# Patient Record
Sex: MALE | Race: WHITE | NOT HISPANIC OR LATINO | URBAN - METROPOLITAN AREA
[De-identification: names, ages, dates, MRNs, and addresses within clinical notes are randomized per-mention and may not be internally consistent; named-entity substitution may affect disease eponyms.]

---

## 2017-01-03 ENCOUNTER — APPOINTMENT (RX ONLY)
Dept: URBAN - METROPOLITAN AREA CLINIC 343 | Facility: CLINIC | Age: 82
Setting detail: DERMATOLOGY
End: 2017-01-03

## 2017-01-03 DIAGNOSIS — L57.0 ACTINIC KERATOSIS: ICD-10-CM

## 2017-01-03 DIAGNOSIS — Z48.02 ENCOUNTER FOR REMOVAL OF SUTURES: ICD-10-CM

## 2017-01-03 PROBLEM — I25.10 ATHEROSCLEROTIC HEART DISEASE OF NATIVE CORONARY ARTERY WITHOUT ANGINA PECTORIS: Status: ACTIVE | Noted: 2017-01-03

## 2017-01-03 PROBLEM — Z85.828 PERSONAL HISTORY OF OTHER MALIGNANT NEOPLASM OF SKIN: Status: ACTIVE | Noted: 2017-01-03

## 2017-01-03 PROCEDURE — ? LIQUID NITROGEN

## 2017-01-03 PROCEDURE — ? SUTURE REMOVAL (GLOBAL PERIOD)

## 2017-01-03 PROCEDURE — 17003 DESTRUCT PREMALG LES 2-14: CPT

## 2017-01-03 PROCEDURE — 17000 DESTRUCT PREMALG LESION: CPT

## 2017-01-03 ASSESSMENT — LOCATION SIMPLE DESCRIPTION DERM
LOCATION SIMPLE: LEFT CALF
LOCATION SIMPLE: POSTERIOR SCALP
LOCATION SIMPLE: SCALP
LOCATION SIMPLE: RIGHT CALF
LOCATION SIMPLE: LEFT EAR

## 2017-01-03 ASSESSMENT — LOCATION DETAILED DESCRIPTION DERM
LOCATION DETAILED: LEFT INFERIOR POSTAURICULAR SKIN
LOCATION DETAILED: RIGHT INFERIOR POSTAURICULAR SKIN
LOCATION DETAILED: LEFT POSTERIOR EAR
LOCATION DETAILED: LEFT POSTAURICULAR SKIN
LOCATION DETAILED: RIGHT DISTAL CALF
LOCATION DETAILED: LEFT DISTAL CALF

## 2017-01-03 ASSESSMENT — LOCATION ZONE DERM
LOCATION ZONE: LEG
LOCATION ZONE: SCALP
LOCATION ZONE: EAR

## 2017-01-03 NOTE — PROCEDURE: SUTURE REMOVAL (GLOBAL PERIOD)
Body Location Override (Optional - Billing Will Still Be Based On Selected Body Map Location If Applicable): right calf
Detail Level: Detailed
Add 57430 Cpt? (Important Note: In 2017 The Use Of 44832 Is Being Tracked By Cms To Determine Future Global Period Reimbursement For Global Periods): no

## 2017-01-03 NOTE — PROCEDURE: LIQUID NITROGEN
Detail Level: Detailed
Duration Of Freeze Thaw-Cycle (Seconds): 5
Number Of Freeze-Thaw Cycles: 3 freeze-thaw cycles
Render Post-Care Instructions In Note?: no
Post-Care Instructions: I reviewed with the patient in detail post-care instructions. Patient is to wear sunprotection, and avoid picking at any of the treated lesions. Pt may apply Vaseline to crusted or scabbing areas.
Number Of Freeze-Thaw Cycles: 2 freeze-thaw cycles
Consent: The patient's consent was obtained including but not limited to risks of crusting, scabbing, blistering, scarring, darker or lighter pigmentary change, recurrence, incomplete removal and infection.

## 2017-02-07 ENCOUNTER — APPOINTMENT (RX ONLY)
Dept: URBAN - METROPOLITAN AREA CLINIC 343 | Facility: CLINIC | Age: 82
Setting detail: DERMATOLOGY
End: 2017-02-07

## 2017-02-07 DIAGNOSIS — L82.1 OTHER SEBORRHEIC KERATOSIS: ICD-10-CM

## 2017-02-07 DIAGNOSIS — Z85.828 PERSONAL HISTORY OF OTHER MALIGNANT NEOPLASM OF SKIN: ICD-10-CM

## 2017-02-07 PROBLEM — L57.0 ACTINIC KERATOSIS: Status: ACTIVE | Noted: 2017-02-07

## 2017-02-07 PROBLEM — D48.5 NEOPLASM OF UNCERTAIN BEHAVIOR OF SKIN: Status: ACTIVE | Noted: 2017-02-07

## 2017-02-07 PROBLEM — E78.5 HYPERLIPIDEMIA, UNSPECIFIED: Status: ACTIVE | Noted: 2017-02-07

## 2017-02-07 PROCEDURE — ? BIOPSY BY SHAVE METHOD

## 2017-02-07 PROCEDURE — 99213 OFFICE O/P EST LOW 20 MIN: CPT | Mod: 25

## 2017-02-07 PROCEDURE — 11100: CPT

## 2017-02-07 PROCEDURE — ? COUNSELING

## 2017-02-07 ASSESSMENT — LOCATION ZONE DERM
LOCATION ZONE: SCALP
LOCATION ZONE: FACE

## 2017-02-07 ASSESSMENT — LOCATION SIMPLE DESCRIPTION DERM
LOCATION SIMPLE: LEFT ZYGOMA
LOCATION SIMPLE: SCALP

## 2017-02-07 ASSESSMENT — LOCATION DETAILED DESCRIPTION DERM
LOCATION DETAILED: LEFT CENTRAL ZYGOMA
LOCATION DETAILED: LEFT SUPERIOR POSTAURICULAR SKIN

## 2017-02-07 NOTE — PROCEDURE: BIOPSY BY SHAVE METHOD
Body Location Override (Optional - Billing Will Still Be Based On Selected Body Map Location If Applicable): left postauricular scalp

## 2017-02-20 ENCOUNTER — APPOINTMENT (RX ONLY)
Dept: URBAN - METROPOLITAN AREA CLINIC 343 | Facility: CLINIC | Age: 82
Setting detail: DERMATOLOGY
End: 2017-02-20

## 2017-02-20 PROBLEM — C44.42 SQUAMOUS CELL CARCINOMA OF SKIN OF SCALP AND NECK: Status: ACTIVE | Noted: 2017-02-20

## 2017-02-20 PROBLEM — L57.0 ACTINIC KERATOSIS: Status: ACTIVE | Noted: 2017-02-20

## 2017-02-20 PROCEDURE — 13121 CMPLX RPR S/A/L 2.6-7.5 CM: CPT

## 2017-02-20 PROCEDURE — ? CONSULTATION FOR MOHS SURGERY

## 2017-02-20 PROCEDURE — 17311 MOHS 1 STAGE H/N/HF/G: CPT

## 2017-02-20 PROCEDURE — 17312 MOHS ADDL STAGE: CPT

## 2017-02-20 PROCEDURE — ? PRESCRIPTION

## 2017-02-20 PROCEDURE — ? MOHS SURGERY

## 2017-02-20 PROCEDURE — 99212 OFFICE O/P EST SF 10 MIN: CPT | Mod: 25

## 2017-02-20 RX ORDER — CEPHALEXIN 500 MG/1
1 CAPSULE ORAL BID
Qty: 14 | Refills: 0 | Status: ERX

## 2017-02-20 NOTE — PROCEDURE: MOHS SURGERY
Stage 2: Additional Anesthesia Type: 1% lidocaine, 0.25% Marcaine, 1:600,000 epinephrine and a 1:10 solution of sodium bicarbonate

## 2017-02-20 NOTE — PROCEDURE: CONSULTATION FOR MOHS SURGERY
Location Indication Override (Is Already Calculated Based On Selected Body Location): Area H
Incorporate Mauc In Note: Yes
Body Location Override (Optional - Billing Will Still Be Based On Selected Body Map Location If Applicable): left postauricular scalp
Size Of Lesion: 1.7
X Size Of Lesion In Cm (Optional): 1.4
Detail Level: Detailed

## 2017-03-06 ENCOUNTER — APPOINTMENT (RX ONLY)
Dept: URBAN - METROPOLITAN AREA CLINIC 343 | Facility: CLINIC | Age: 82
Setting detail: DERMATOLOGY
End: 2017-03-06

## 2017-03-06 DIAGNOSIS — Z48.02 ENCOUNTER FOR REMOVAL OF SUTURES: ICD-10-CM

## 2017-03-06 PROCEDURE — ? SUTURE REMOVAL (GLOBAL PERIOD)

## 2017-03-06 ASSESSMENT — LOCATION ZONE DERM: LOCATION ZONE: EAR

## 2017-03-06 ASSESSMENT — LOCATION SIMPLE DESCRIPTION DERM: LOCATION SIMPLE: LEFT EAR

## 2017-03-06 ASSESSMENT — LOCATION DETAILED DESCRIPTION DERM: LOCATION DETAILED: LEFT POSTAURICULAR CREASE

## 2017-03-06 NOTE — PROCEDURE: SUTURE REMOVAL (GLOBAL PERIOD)
Detail Level: Detailed
Body Location Override (Optional - Billing Will Still Be Based On Selected Body Map Location If Applicable): left postauricular scalp
Add 77986 Cpt? (Important Note: In 2017 The Use Of 00692 Is Being Tracked By Cms To Determine Future Global Period Reimbursement For Global Periods): no

## 2017-08-01 ENCOUNTER — APPOINTMENT (RX ONLY)
Dept: URBAN - METROPOLITAN AREA CLINIC 343 | Facility: CLINIC | Age: 82
Setting detail: DERMATOLOGY
End: 2017-08-01

## 2017-08-01 DIAGNOSIS — L57.0 ACTINIC KERATOSIS: ICD-10-CM

## 2017-08-01 DIAGNOSIS — B07.8 OTHER VIRAL WARTS: ICD-10-CM

## 2017-08-01 DIAGNOSIS — D22 MELANOCYTIC NEVI: ICD-10-CM

## 2017-08-01 DIAGNOSIS — Z85.828 PERSONAL HISTORY OF OTHER MALIGNANT NEOPLASM OF SKIN: ICD-10-CM

## 2017-08-01 DIAGNOSIS — D18.0 HEMANGIOMA: ICD-10-CM

## 2017-08-01 DIAGNOSIS — L82.1 OTHER SEBORRHEIC KERATOSIS: ICD-10-CM

## 2017-08-01 PROBLEM — D22.5 MELANOCYTIC NEVI OF TRUNK: Status: ACTIVE | Noted: 2017-08-01

## 2017-08-01 PROBLEM — I25.10 ATHEROSCLEROTIC HEART DISEASE OF NATIVE CORONARY ARTERY WITHOUT ANGINA PECTORIS: Status: ACTIVE | Noted: 2017-08-01

## 2017-08-01 PROBLEM — D48.5 NEOPLASM OF UNCERTAIN BEHAVIOR OF SKIN: Status: ACTIVE | Noted: 2017-08-01

## 2017-08-01 PROBLEM — E78.5 HYPERLIPIDEMIA, UNSPECIFIED: Status: ACTIVE | Noted: 2017-08-01

## 2017-08-01 PROBLEM — D18.01 HEMANGIOMA OF SKIN AND SUBCUTANEOUS TISSUE: Status: ACTIVE | Noted: 2017-08-01

## 2017-08-01 PROCEDURE — 17110 DESTRUCTION B9 LES UP TO 14: CPT

## 2017-08-01 PROCEDURE — ? LIQUID NITROGEN

## 2017-08-01 PROCEDURE — 11100: CPT | Mod: 59

## 2017-08-01 PROCEDURE — ? BIOPSY BY SHAVE METHOD

## 2017-08-01 PROCEDURE — ? COUNSELING

## 2017-08-01 PROCEDURE — 99213 OFFICE O/P EST LOW 20 MIN: CPT | Mod: 25

## 2017-08-01 PROCEDURE — 17000 DESTRUCT PREMALG LESION: CPT | Mod: 59

## 2017-08-01 PROCEDURE — 17003 DESTRUCT PREMALG LES 2-14: CPT

## 2017-08-01 ASSESSMENT — LOCATION DETAILED DESCRIPTION DERM
LOCATION DETAILED: RIGHT PROXIMAL DORSAL FOREARM
LOCATION DETAILED: LEFT LATERAL DORSAL WRIST
LOCATION DETAILED: LEFT RADIAL DORSAL HAND
LOCATION DETAILED: RIGHT SUPERIOR PARIETAL SCALP
LOCATION DETAILED: EPIGASTRIC SKIN
LOCATION DETAILED: RIGHT RADIAL DORSAL HAND
LOCATION DETAILED: PERIUMBILICAL SKIN
LOCATION DETAILED: LEFT SUPERIOR PARIETAL SCALP
LOCATION DETAILED: RIGHT LATERAL EYEBROW

## 2017-08-01 ASSESSMENT — LOCATION SIMPLE DESCRIPTION DERM
LOCATION SIMPLE: RIGHT EYEBROW
LOCATION SIMPLE: ABDOMEN
LOCATION SIMPLE: LEFT HAND
LOCATION SIMPLE: RIGHT FOREARM
LOCATION SIMPLE: RIGHT HAND
LOCATION SIMPLE: SCALP
LOCATION SIMPLE: LEFT WRIST

## 2017-08-01 ASSESSMENT — LOCATION ZONE DERM
LOCATION ZONE: TRUNK
LOCATION ZONE: FACE
LOCATION ZONE: ARM
LOCATION ZONE: HAND
LOCATION ZONE: SCALP

## 2017-08-22 ENCOUNTER — APPOINTMENT (RX ONLY)
Dept: URBAN - METROPOLITAN AREA CLINIC 343 | Facility: CLINIC | Age: 82
Setting detail: DERMATOLOGY
End: 2017-08-22

## 2017-08-22 DIAGNOSIS — L82.1 OTHER SEBORRHEIC KERATOSIS: ICD-10-CM

## 2017-08-22 PROBLEM — C44.629 SQUAMOUS CELL CARCINOMA OF SKIN OF LEFT UPPER LIMB, INCLUDING SHOULDER: Status: ACTIVE | Noted: 2017-08-22

## 2017-08-22 PROBLEM — Z85.828 PERSONAL HISTORY OF OTHER MALIGNANT NEOPLASM OF SKIN: Status: ACTIVE | Noted: 2017-08-22

## 2017-08-22 PROBLEM — E78.5 HYPERLIPIDEMIA, UNSPECIFIED: Status: ACTIVE | Noted: 2017-08-22

## 2017-08-22 PROBLEM — I25.10 ATHEROSCLEROTIC HEART DISEASE OF NATIVE CORONARY ARTERY WITHOUT ANGINA PECTORIS: Status: ACTIVE | Noted: 2017-08-22

## 2017-08-22 PROCEDURE — ? EXCISION

## 2017-08-22 PROCEDURE — 11603 EXC TR-EXT MAL+MARG 2.1-3 CM: CPT

## 2017-08-22 PROCEDURE — 12032 INTMD RPR S/A/T/EXT 2.6-7.5: CPT

## 2017-08-22 PROCEDURE — ? COUNSELING

## 2017-08-22 PROCEDURE — 99212 OFFICE O/P EST SF 10 MIN: CPT | Mod: 25

## 2017-08-22 ASSESSMENT — LOCATION ZONE DERM
LOCATION ZONE: LEG
LOCATION ZONE: TRUNK

## 2017-08-22 ASSESSMENT — LOCATION DETAILED DESCRIPTION DERM
LOCATION DETAILED: RIGHT DISTAL PRETIBIAL REGION
LOCATION DETAILED: LEFT DISTAL PRETIBIAL REGION
LOCATION DETAILED: LEFT LATERAL ABDOMEN

## 2017-08-22 ASSESSMENT — LOCATION SIMPLE DESCRIPTION DERM
LOCATION SIMPLE: ABDOMEN
LOCATION SIMPLE: RIGHT PRETIBIAL REGION
LOCATION SIMPLE: LEFT PRETIBIAL REGION

## 2017-08-22 NOTE — PROCEDURE: EXCISION
Path Notes (To The Dermatopathologist): Please check margins. Tagged at 12 o'clock superiorly. Previous accession: [id number] Path Notes (To The Dermatopathologist): Please check margins. Tagged at 12 o'clock superiorly. Previous accession: [id number]

## 2017-09-06 ENCOUNTER — APPOINTMENT (RX ONLY)
Dept: URBAN - METROPOLITAN AREA CLINIC 343 | Facility: CLINIC | Age: 82
Setting detail: DERMATOLOGY
End: 2017-09-06

## 2017-09-06 DIAGNOSIS — Z48.02 ENCOUNTER FOR REMOVAL OF SUTURES: ICD-10-CM

## 2017-09-06 PROBLEM — Z85.828 PERSONAL HISTORY OF OTHER MALIGNANT NEOPLASM OF SKIN: Status: ACTIVE | Noted: 2017-09-06

## 2017-09-06 PROCEDURE — ? SUTURE REMOVAL (GLOBAL PERIOD)

## 2017-09-06 ASSESSMENT — LOCATION SIMPLE DESCRIPTION DERM: LOCATION SIMPLE: LEFT FOREARM

## 2017-09-06 ASSESSMENT — LOCATION DETAILED DESCRIPTION DERM: LOCATION DETAILED: LEFT DISTAL DORSAL FOREARM

## 2017-09-06 ASSESSMENT — LOCATION ZONE DERM: LOCATION ZONE: ARM

## 2017-09-06 NOTE — PROCEDURE: SUTURE REMOVAL (GLOBAL PERIOD)
Add 37608 Cpt? (Important Note: In 2017 The Use Of 89092 Is Being Tracked By Cms To Determine Future Global Period Reimbursement For Global Periods): no

## 2017-09-18 ENCOUNTER — APPOINTMENT (RX ONLY)
Dept: URBAN - METROPOLITAN AREA CLINIC 343 | Facility: CLINIC | Age: 82
Setting detail: DERMATOLOGY
End: 2017-09-18

## 2017-09-18 DIAGNOSIS — L82.1 OTHER SEBORRHEIC KERATOSIS: ICD-10-CM

## 2017-09-18 PROBLEM — L57.0 ACTINIC KERATOSIS: Status: ACTIVE | Noted: 2017-09-18

## 2017-09-18 PROCEDURE — ? LIQUID NITROGEN (COSMETIC)

## 2017-09-18 ASSESSMENT — LOCATION DETAILED DESCRIPTION DERM
LOCATION DETAILED: LEFT LATERAL TRAPEZIAL NECK
LOCATION DETAILED: LEFT LATERAL ABDOMEN
LOCATION DETAILED: RIGHT INFERIOR LATERAL UPPER BACK
LOCATION DETAILED: LEFT RIB CAGE
LOCATION DETAILED: LEFT POSTERIOR SHOULDER
LOCATION DETAILED: RIGHT SUPERIOR MEDIAL MIDBACK
LOCATION DETAILED: RIGHT INFRAMAMMARY CREASE (OUTER QUADRANT)
LOCATION DETAILED: LEFT PROXIMAL POSTERIOR UPPER ARM
LOCATION DETAILED: LEFT LATERAL UPPER BACK
LOCATION DETAILED: LEFT INFERIOR UPPER BACK
LOCATION DETAILED: RIGHT INFERIOR LATERAL MIDBACK
LOCATION DETAILED: RIGHT SUPERIOR LATERAL UPPER BACK
LOCATION DETAILED: LEFT MID-UPPER BACK
LOCATION DETAILED: LEFT SUPERIOR UPPER BACK
LOCATION DETAILED: LEFT INFERIOR LATERAL MIDBACK
LOCATION DETAILED: RIGHT INFERIOR UPPER BACK
LOCATION DETAILED: LEFT SUPERIOR LATERAL LOWER BACK
LOCATION DETAILED: RIGHT INFERIOR MEDIAL UPPER BACK
LOCATION DETAILED: RIGHT SUPERIOR LATERAL LOWER BACK
LOCATION DETAILED: LEFT SUPERIOR LATERAL MIDBACK
LOCATION DETAILED: RIGHT MID-UPPER BACK
LOCATION DETAILED: LEFT SUPERIOR LATERAL UPPER BACK
LOCATION DETAILED: RIGHT LATERAL UPPER BACK
LOCATION DETAILED: RIGHT INFERIOR MEDIAL MIDBACK
LOCATION DETAILED: RIGHT SUPERIOR LATERAL MIDBACK
LOCATION DETAILED: LEFT INFERIOR LATERAL LOWER BACK
LOCATION DETAILED: RIGHT LATERAL ABDOMEN
LOCATION DETAILED: LEFT INFERIOR LATERAL UPPER BACK
LOCATION DETAILED: RIGHT POSTERIOR SHOULDER
LOCATION DETAILED: RIGHT RIB CAGE
LOCATION DETAILED: LEFT PROXIMAL LATERAL POSTERIOR UPPER ARM
LOCATION DETAILED: LEFT INFERIOR MEDIAL MIDBACK
LOCATION DETAILED: RIGHT MEDIAL UPPER BACK

## 2017-09-18 ASSESSMENT — LOCATION SIMPLE DESCRIPTION DERM
LOCATION SIMPLE: LEFT UPPER BACK
LOCATION SIMPLE: RIGHT BREAST
LOCATION SIMPLE: ABDOMEN
LOCATION SIMPLE: RIGHT LOWER BACK
LOCATION SIMPLE: LEFT UPPER ARM
LOCATION SIMPLE: LEFT LOWER BACK
LOCATION SIMPLE: POSTERIOR NECK
LOCATION SIMPLE: RIGHT SHOULDER
LOCATION SIMPLE: LEFT SHOULDER
LOCATION SIMPLE: RIGHT UPPER BACK

## 2017-09-18 ASSESSMENT — LOCATION ZONE DERM
LOCATION ZONE: NECK
LOCATION ZONE: ARM
LOCATION ZONE: TRUNK

## 2017-09-18 NOTE — PROCEDURE: LIQUID NITROGEN (COSMETIC)
Post-Care Instructions: I reviewed with the patient in detail post-care instructions. Patient is to wear sunprotection, and avoid picking at any of the treated lesions. Pt may apply Aquaphor ointment to crusted or scabbing areas.
Detail Level: Detailed
Price (Use Numbers Only, No Special Characters Or $): 0
Consent: The patient's consent was obtained including but not limited to risks of crusting, scabbing, blistering, scarring, darker or lighter pigmentary change, recurrence, incomplete removal and infection. The patient understands that the procedure is cosmetic in nature and is not covered by insurance.
Render Post-Care Instructions In Note?: yes

## 2018-02-01 ENCOUNTER — APPOINTMENT (RX ONLY)
Dept: URBAN - METROPOLITAN AREA CLINIC 343 | Facility: CLINIC | Age: 83
Setting detail: DERMATOLOGY
End: 2018-02-01

## 2018-02-01 DIAGNOSIS — B07.8 OTHER VIRAL WARTS: ICD-10-CM

## 2018-02-01 DIAGNOSIS — L57.0 ACTINIC KERATOSIS: ICD-10-CM

## 2018-02-01 DIAGNOSIS — L82.0 INFLAMED SEBORRHEIC KERATOSIS: ICD-10-CM

## 2018-02-01 DIAGNOSIS — D22 MELANOCYTIC NEVI: ICD-10-CM

## 2018-02-01 DIAGNOSIS — L82.1 OTHER SEBORRHEIC KERATOSIS: ICD-10-CM

## 2018-02-01 DIAGNOSIS — Z85.828 PERSONAL HISTORY OF OTHER MALIGNANT NEOPLASM OF SKIN: ICD-10-CM

## 2018-02-01 PROBLEM — D22.5 MELANOCYTIC NEVI OF TRUNK: Status: ACTIVE | Noted: 2018-02-01

## 2018-02-01 PROBLEM — D48.5 NEOPLASM OF UNCERTAIN BEHAVIOR OF SKIN: Status: ACTIVE | Noted: 2018-02-01

## 2018-02-01 PROBLEM — E78.5 HYPERLIPIDEMIA, UNSPECIFIED: Status: ACTIVE | Noted: 2018-02-01

## 2018-02-01 PROBLEM — I25.10 ATHEROSCLEROTIC HEART DISEASE OF NATIVE CORONARY ARTERY WITHOUT ANGINA PECTORIS: Status: ACTIVE | Noted: 2018-02-01

## 2018-02-01 PROCEDURE — 17110 DESTRUCTION B9 LES UP TO 14: CPT

## 2018-02-01 PROCEDURE — 99213 OFFICE O/P EST LOW 20 MIN: CPT | Mod: 25

## 2018-02-01 PROCEDURE — ? COUNSELING

## 2018-02-01 PROCEDURE — ? LIQUID NITROGEN

## 2018-02-01 PROCEDURE — 17000 DESTRUCT PREMALG LESION: CPT | Mod: 59

## 2018-02-01 PROCEDURE — ? BIOPSY BY SHAVE METHOD

## 2018-02-01 PROCEDURE — ? OTHER

## 2018-02-01 PROCEDURE — 17003 DESTRUCT PREMALG LES 2-14: CPT

## 2018-02-01 PROCEDURE — 11100: CPT | Mod: 59

## 2018-02-01 ASSESSMENT — LOCATION ZONE DERM
LOCATION ZONE: NECK
LOCATION ZONE: NOSE
LOCATION ZONE: EAR
LOCATION ZONE: SCALP
LOCATION ZONE: TRUNK
LOCATION ZONE: LEG
LOCATION ZONE: FACE
LOCATION ZONE: ARM

## 2018-02-01 ASSESSMENT — LOCATION DETAILED DESCRIPTION DERM
LOCATION DETAILED: RIGHT INFERIOR CRUS OF ANTIHELIX
LOCATION DETAILED: LEFT INFERIOR CRUS OF ANTIHELIX
LOCATION DETAILED: RIGHT MID TEMPLE
LOCATION DETAILED: RIGHT LATERAL EYEBROW
LOCATION DETAILED: LEFT LATERAL ZYGOMA
LOCATION DETAILED: RIGHT DISTAL DORSAL FOREARM
LOCATION DETAILED: RIGHT SUPERIOR UPPER BACK
LOCATION DETAILED: RIGHT POSTERIOR PARIETAL SCALP
LOCATION DETAILED: LEFT SUPERIOR LATERAL NECK
LOCATION DETAILED: LEFT SUPERIOR HELIX
LOCATION DETAILED: RIGHT DISTAL PRETIBIAL REGION
LOCATION DETAILED: RIGHT SUPERIOR OCCIPITAL SCALP
LOCATION DETAILED: POSTERIOR MID-PARIETAL SCALP
LOCATION DETAILED: LEFT LATERAL TRAPEZIAL NECK
LOCATION DETAILED: LEFT NASAL SIDEWALL
LOCATION DETAILED: LEFT SUPERIOR LATERAL MALAR CHEEK
LOCATION DETAILED: LEFT SUPERIOR UPPER BACK

## 2018-02-01 ASSESSMENT — LOCATION SIMPLE DESCRIPTION DERM
LOCATION SIMPLE: LEFT NOSE
LOCATION SIMPLE: RIGHT UPPER BACK
LOCATION SIMPLE: POSTERIOR SCALP
LOCATION SIMPLE: RIGHT EAR
LOCATION SIMPLE: NECK
LOCATION SIMPLE: LEFT ZYGOMA
LOCATION SIMPLE: RIGHT FOREARM
LOCATION SIMPLE: LEFT CHEEK
LOCATION SIMPLE: RIGHT EYEBROW
LOCATION SIMPLE: LEFT UPPER BACK
LOCATION SIMPLE: RIGHT PRETIBIAL REGION
LOCATION SIMPLE: LEFT EAR
LOCATION SIMPLE: POSTERIOR NECK
LOCATION SIMPLE: RIGHT TEMPLE

## 2018-02-01 NOTE — PROCEDURE: LIQUID NITROGEN
Render Post-Care Instructions In Note?: yes
Detail Level: Detailed
Render Post-Care Instructions In Note?: no
Post-Care Instructions: I reviewed with the patient in detail post-care instructions. Patient is to wear sunprotection, and avoid picking at any of the treated lesions. Pt may apply Vaseline to crusted or scabbing areas.
Medical Necessity Clause: This procedure was medically necessary because the lesions that were treated were:
Medical Necessity Information: It is in your best interest to select a reason for this procedure from the list below. All of these items fulfill various CMS LCD requirements except the new and changing color options.
Consent: The patient's consent was obtained including but not limited to risks of crusting, scabbing, blistering, scarring, darker or lighter pigmentary change, recurrence, incomplete removal and infection.
Duration Of Freeze Thaw-Cycle (Seconds): 3
Number Of Freeze-Thaw Cycles: 1 freeze-thaw cycle

## 2018-02-27 ENCOUNTER — APPOINTMENT (RX ONLY)
Dept: URBAN - METROPOLITAN AREA CLINIC 343 | Facility: CLINIC | Age: 83
Setting detail: DERMATOLOGY
End: 2018-02-27

## 2018-02-27 DIAGNOSIS — L57.0 ACTINIC KERATOSIS: ICD-10-CM

## 2018-02-27 PROBLEM — Z85.828 PERSONAL HISTORY OF OTHER MALIGNANT NEOPLASM OF SKIN: Status: ACTIVE | Noted: 2018-02-27

## 2018-02-27 PROBLEM — I25.10 ATHEROSCLEROTIC HEART DISEASE OF NATIVE CORONARY ARTERY WITHOUT ANGINA PECTORIS: Status: ACTIVE | Noted: 2018-02-27

## 2018-02-27 PROCEDURE — ? LIQUID NITROGEN

## 2018-02-27 PROCEDURE — 17000 DESTRUCT PREMALG LESION: CPT

## 2018-02-27 PROCEDURE — 17003 DESTRUCT PREMALG LES 2-14: CPT

## 2018-02-27 PROCEDURE — ? COUNSELING

## 2018-02-27 ASSESSMENT — LOCATION DETAILED DESCRIPTION DERM
LOCATION DETAILED: RIGHT INFERIOR CENTRAL MALAR CHEEK
LOCATION DETAILED: RIGHT PROXIMAL DORSAL FOREARM

## 2018-02-27 ASSESSMENT — LOCATION SIMPLE DESCRIPTION DERM
LOCATION SIMPLE: RIGHT CHEEK
LOCATION SIMPLE: RIGHT FOREARM

## 2018-02-27 ASSESSMENT — LOCATION ZONE DERM
LOCATION ZONE: ARM
LOCATION ZONE: FACE

## 2018-02-27 NOTE — PROCEDURE: LIQUID NITROGEN
Number Of Freeze-Thaw Cycles: 3 freeze-thaw cycles
Consent: The patient's consent was obtained including but not limited to risks of crusting, scabbing, blistering, scarring, darker or lighter pigmentary change, recurrence, incomplete removal and infection.
Duration Of Freeze Thaw-Cycle (Seconds): 3
Detail Level: Simple
Render Post-Care Instructions In Note?: no
Post-Care Instructions: I reviewed with the patient in detail post-care instructions. Patient is to wear sunprotection, and avoid picking at any of the treated lesions. Pt may apply Vaseline to crusted or scabbing areas.

## 2018-08-02 ENCOUNTER — APPOINTMENT (RX ONLY)
Dept: URBAN - METROPOLITAN AREA CLINIC 343 | Facility: CLINIC | Age: 83
Setting detail: DERMATOLOGY
End: 2018-08-02

## 2018-08-02 DIAGNOSIS — L57.0 ACTINIC KERATOSIS: ICD-10-CM

## 2018-08-02 DIAGNOSIS — B07.8 OTHER VIRAL WARTS: ICD-10-CM

## 2018-08-02 DIAGNOSIS — L82.1 OTHER SEBORRHEIC KERATOSIS: ICD-10-CM

## 2018-08-02 DIAGNOSIS — D22 MELANOCYTIC NEVI: ICD-10-CM

## 2018-08-02 DIAGNOSIS — Z85.828 PERSONAL HISTORY OF OTHER MALIGNANT NEOPLASM OF SKIN: ICD-10-CM

## 2018-08-02 DIAGNOSIS — D18.0 HEMANGIOMA: ICD-10-CM

## 2018-08-02 DIAGNOSIS — L82.0 INFLAMED SEBORRHEIC KERATOSIS: ICD-10-CM

## 2018-08-02 PROBLEM — D22.5 MELANOCYTIC NEVI OF TRUNK: Status: ACTIVE | Noted: 2018-08-02

## 2018-08-02 PROBLEM — D18.01 HEMANGIOMA OF SKIN AND SUBCUTANEOUS TISSUE: Status: ACTIVE | Noted: 2018-08-02

## 2018-08-02 PROBLEM — I25.10 ATHEROSCLEROTIC HEART DISEASE OF NATIVE CORONARY ARTERY WITHOUT ANGINA PECTORIS: Status: ACTIVE | Noted: 2018-08-02

## 2018-08-02 PROBLEM — E78.5 HYPERLIPIDEMIA, UNSPECIFIED: Status: ACTIVE | Noted: 2018-08-02

## 2018-08-02 PROBLEM — D48.5 NEOPLASM OF UNCERTAIN BEHAVIOR OF SKIN: Status: ACTIVE | Noted: 2018-08-02

## 2018-08-02 PROCEDURE — ? BIOPSY BY SHAVE METHOD

## 2018-08-02 PROCEDURE — 99213 OFFICE O/P EST LOW 20 MIN: CPT | Mod: 25

## 2018-08-02 PROCEDURE — ? COUNSELING

## 2018-08-02 PROCEDURE — 11101: CPT

## 2018-08-02 PROCEDURE — 17004 DESTROY PREMAL LESIONS 15/>: CPT

## 2018-08-02 PROCEDURE — 11100: CPT | Mod: 59

## 2018-08-02 PROCEDURE — ? LIQUID NITROGEN

## 2018-08-02 PROCEDURE — 17110 DESTRUCTION B9 LES UP TO 14: CPT | Mod: 59

## 2018-08-02 ASSESSMENT — LOCATION SIMPLE DESCRIPTION DERM
LOCATION SIMPLE: RIGHT FOREARM
LOCATION SIMPLE: RIGHT SCALP
LOCATION SIMPLE: LEFT SHOULDER
LOCATION SIMPLE: RIGHT HAND
LOCATION SIMPLE: LEFT UPPER ARM
LOCATION SIMPLE: LEFT WRIST
LOCATION SIMPLE: RIGHT PRETIBIAL REGION
LOCATION SIMPLE: SCALP
LOCATION SIMPLE: LEFT FOREARM
LOCATION SIMPLE: LEFT PRETIBIAL REGION
LOCATION SIMPLE: NOSE
LOCATION SIMPLE: NECK
LOCATION SIMPLE: LEFT CHEEK
LOCATION SIMPLE: LEFT ELBOW
LOCATION SIMPLE: RIGHT EAR
LOCATION SIMPLE: SUPERIOR FOREHEAD
LOCATION SIMPLE: LEFT HAND
LOCATION SIMPLE: LEFT CLAVICULAR SKIN
LOCATION SIMPLE: ABDOMEN
LOCATION SIMPLE: LEFT INDEX FINGER
LOCATION SIMPLE: RIGHT UPPER BACK
LOCATION SIMPLE: LEFT UPPER BACK
LOCATION SIMPLE: RIGHT CHEEK
LOCATION SIMPLE: LEFT SCALP

## 2018-08-02 ASSESSMENT — LOCATION DETAILED DESCRIPTION DERM
LOCATION DETAILED: LEFT PROXIMAL DORSAL INDEX FINGER
LOCATION DETAILED: RIGHT SUPERIOR UPPER BACK
LOCATION DETAILED: LEFT PROXIMAL DORSAL FOREARM
LOCATION DETAILED: 2ND WEB SPACE LEFT HAND
LOCATION DETAILED: RIGHT PROXIMAL DORSAL FOREARM
LOCATION DETAILED: LEFT DISTAL PRETIBIAL REGION
LOCATION DETAILED: LEFT DISTAL DORSAL FOREARM
LOCATION DETAILED: LEFT CLAVICULAR SKIN
LOCATION DETAILED: LEFT DORSAL WRIST
LOCATION DETAILED: LEFT PROXIMAL PRETIBIAL REGION
LOCATION DETAILED: RIGHT CENTRAL LATERAL NECK
LOCATION DETAILED: EPIGASTRIC SKIN
LOCATION DETAILED: PERIUMBILICAL SKIN
LOCATION DETAILED: LEFT ELBOW
LOCATION DETAILED: RIGHT SUPERIOR PARIETAL SCALP
LOCATION DETAILED: RIGHT MID-UPPER BACK
LOCATION DETAILED: RIGHT MEDIAL UPPER BACK
LOCATION DETAILED: LEFT ANTERIOR SHOULDER
LOCATION DETAILED: RIGHT PROXIMAL ULNAR DORSAL FOREARM
LOCATION DETAILED: RIGHT MEDIAL FRONTAL SCALP
LOCATION DETAILED: LEFT SUPERIOR UPPER BACK
LOCATION DETAILED: LEFT MEDIAL FRONTAL SCALP
LOCATION DETAILED: RIGHT DISTAL DORSAL FOREARM
LOCATION DETAILED: RIGHT ULNAR DORSAL HAND
LOCATION DETAILED: LEFT SUPERIOR LATERAL BUCCAL CHEEK
LOCATION DETAILED: RIGHT SUPERIOR CENTRAL MALAR CHEEK
LOCATION DETAILED: RIGHT PROXIMAL PRETIBIAL REGION
LOCATION DETAILED: NASAL DORSUM
LOCATION DETAILED: LEFT INFERIOR LATERAL MALAR CHEEK
LOCATION DETAILED: LEFT DORSAL RING METACARPOPHALANGEAL JOINT
LOCATION DETAILED: RIGHT ANTIHELIX
LOCATION DETAILED: RIGHT INFERIOR LATERAL MALAR CHEEK
LOCATION DETAILED: LEFT SUPERIOR LATERAL NECK
LOCATION DETAILED: SUPERIOR MID FOREHEAD
LOCATION DETAILED: RIGHT SUPERIOR CRUS OF ANTIHELIX
LOCATION DETAILED: RIGHT RADIAL DORSAL HAND
LOCATION DETAILED: LEFT DISTAL POSTERIOR UPPER ARM

## 2018-08-02 ASSESSMENT — LOCATION ZONE DERM
LOCATION ZONE: FACE
LOCATION ZONE: LEG
LOCATION ZONE: SCALP
LOCATION ZONE: NECK
LOCATION ZONE: TRUNK
LOCATION ZONE: FINGER
LOCATION ZONE: HAND
LOCATION ZONE: EAR
LOCATION ZONE: NOSE
LOCATION ZONE: ARM

## 2018-08-02 NOTE — PROCEDURE: BIOPSY BY SHAVE METHOD
Body Location Override (Optional - Billing Will Still Be Based On Selected Body Map Location If Applicable): right medial pretibial

## 2018-08-02 NOTE — PROCEDURE: LIQUID NITROGEN
Consent: The patient's consent was obtained including but not limited to risks of crusting, scabbing, blistering, scarring, darker or lighter pigmentary change, recurrence, incomplete removal and infection.
Post-Care Instructions: I reviewed with the patient in detail post-care instructions. Patient is to wear sunprotection, and avoid picking at any of the treated lesions. Pt may apply Vaseline to crusted or scabbing areas.
Duration Of Freeze Thaw-Cycle (Seconds): 3
Number Of Freeze-Thaw Cycles: 1 freeze-thaw cycle
Medical Necessity Clause: This procedure was medically necessary because the lesions that were treated were:
Medical Necessity Information: It is in your best interest to select a reason for this procedure from the list below. All of these items fulfill various CMS LCD requirements except the new and changing color options.
Detail Level: Simple
Add 52 Modifier (Optional): no
Render Post-Care Instructions In Note?: yes
Detail Level: Detailed

## 2018-08-13 ENCOUNTER — APPOINTMENT (RX ONLY)
Dept: URBAN - METROPOLITAN AREA CLINIC 343 | Facility: CLINIC | Age: 83
Setting detail: DERMATOLOGY
End: 2018-08-13

## 2018-08-13 PROBLEM — Z85.828 PERSONAL HISTORY OF OTHER MALIGNANT NEOPLASM OF SKIN: Status: ACTIVE | Noted: 2018-08-13

## 2018-08-13 PROBLEM — I25.10 ATHEROSCLEROTIC HEART DISEASE OF NATIVE CORONARY ARTERY WITHOUT ANGINA PECTORIS: Status: ACTIVE | Noted: 2018-08-13

## 2018-08-13 PROBLEM — C44.722 SQUAMOUS CELL CARCINOMA OF SKIN OF RIGHT LOWER LIMB, INCLUDING HIP: Status: ACTIVE | Noted: 2018-08-13

## 2018-08-13 PROCEDURE — ? MOHS SURGERY

## 2018-08-13 PROCEDURE — ? CONSULTATION FOR MOHS SURGERY

## 2018-08-13 PROCEDURE — 17313 MOHS 1 STAGE T/A/L: CPT

## 2018-08-13 PROCEDURE — 13121 CMPLX RPR S/A/L 2.6-7.5 CM: CPT

## 2018-08-13 PROCEDURE — 99212 OFFICE O/P EST SF 10 MIN: CPT | Mod: 25

## 2018-08-13 PROCEDURE — ? PRESCRIPTION

## 2018-08-13 RX ORDER — CEPHALEXIN 500 MG/1
1 CAPSULE ORAL BID
Qty: 28 | Refills: 0 | Status: ERX | COMMUNITY
Start: 2018-08-13

## 2018-08-13 RX ADMIN — CEPHALEXIN 1: 500 CAPSULE ORAL at 18:12

## 2018-08-13 NOTE — PROCEDURE: CONSULTATION FOR MOHS SURGERY
Incorporate Mauc In Note: Yes
Size Of Lesion: 0.8
Detail Level: Detailed
Body Location Override (Optional - Billing Will Still Be Based On Selected Body Map Location If Applicable): right medial pretibial
Location Indication Override (Is Already Calculated Based On Selected Body Location): Area M

## 2018-08-14 ENCOUNTER — APPOINTMENT (RX ONLY)
Dept: URBAN - METROPOLITAN AREA CLINIC 343 | Facility: CLINIC | Age: 83
Setting detail: DERMATOLOGY
End: 2018-08-14

## 2018-08-14 DIAGNOSIS — R60.0 LOCALIZED EDEMA: ICD-10-CM

## 2018-08-14 DIAGNOSIS — Z48.01 ENCOUNTER FOR CHANGE OR REMOVAL OF SURGICAL WOUND DRESSING: ICD-10-CM

## 2018-08-14 PROBLEM — E78.5 HYPERLIPIDEMIA, UNSPECIFIED: Status: ACTIVE | Noted: 2018-08-14

## 2018-08-14 PROBLEM — Z85.828 PERSONAL HISTORY OF OTHER MALIGNANT NEOPLASM OF SKIN: Status: ACTIVE | Noted: 2018-08-14

## 2018-08-14 PROBLEM — C44.722 SQUAMOUS CELL CARCINOMA OF SKIN OF RIGHT LOWER LIMB, INCLUDING HIP: Status: ACTIVE | Noted: 2018-08-14

## 2018-08-14 PROBLEM — L57.0 ACTINIC KERATOSIS: Status: ACTIVE | Noted: 2018-08-14

## 2018-08-14 PROBLEM — I25.10 ATHEROSCLEROTIC HEART DISEASE OF NATIVE CORONARY ARTERY WITHOUT ANGINA PECTORIS: Status: ACTIVE | Noted: 2018-08-14

## 2018-08-14 PROCEDURE — 11604 EXC TR-EXT MAL+MARG 3.1-4 CM: CPT | Mod: 79

## 2018-08-14 PROCEDURE — 12032 INTMD RPR S/A/T/EXT 2.6-7.5: CPT | Mod: 59,79

## 2018-08-14 PROCEDURE — 29580 STRAPPING UNNA BOOT: CPT | Mod: RT

## 2018-08-14 PROCEDURE — ? UNNA BOOT APPLICATION

## 2018-08-14 PROCEDURE — ? EXCISION

## 2018-08-14 PROCEDURE — ? DRESSING CHANGE

## 2018-08-14 PROCEDURE — 99212 OFFICE O/P EST SF 10 MIN: CPT | Mod: 24,25

## 2018-08-14 ASSESSMENT — LOCATION SIMPLE DESCRIPTION DERM: LOCATION SIMPLE: RIGHT PRETIBIAL REGION

## 2018-08-14 ASSESSMENT — LOCATION DETAILED DESCRIPTION DERM: LOCATION DETAILED: RIGHT PROXIMAL PRETIBIAL REGION

## 2018-08-14 ASSESSMENT — LOCATION ZONE DERM: LOCATION ZONE: LEG

## 2018-08-14 NOTE — PROCEDURE: UNNA BOOT APPLICATION
After Unna Boot Application Text: Coban was used to wrap the outside of Unna Boot and we ensured the Unna Boot wasn't too tight prior to the patient leaving the office.
Removal Of Previous Unna Boot (Yes) Text: The previous Unna Boot and then the site was cleaned in preparation for another Unna Boot application.
Detail Level: Simple
Location Applied: the right leg
Indication: post-operative immobilization
Removal Of Previous Unna Boot (No) Text: The site was cleaned in preparation for an Unna Boot application.
Was A Previous Unna Boot Removed?: No

## 2018-08-14 NOTE — PROCEDURE: EXCISION
Path Notes (To The Dermatopathologist): Please check margins. Tagged at 12 o'clock superiorly. \\nAccession# [id number] Path Notes (To The Dermatopathologist): Please check margins. Tagged at 12 o'clock superiorly. \\nAccession# [id number]

## 2018-08-14 NOTE — PROCEDURE: DRESSING CHANGE
Detail Level: Detailed
Wound Evaluated By: Marilin Fitzgerald CDT
Add 98641 Cpt? (Important Note: In 2017 The Use Of 84180 Is Being Tracked By Cms To Determine Future Global Period Reimbursement For Global Periods): no
Body Location Override (Optional - Billing Will Still Be Based On Selected Body Map Location If Applicable): right medial pretibial

## 2018-08-17 ENCOUNTER — APPOINTMENT (RX ONLY)
Dept: URBAN - METROPOLITAN AREA CLINIC 343 | Facility: CLINIC | Age: 83
Setting detail: DERMATOLOGY
End: 2018-08-17

## 2018-08-17 DIAGNOSIS — Z48.01 ENCOUNTER FOR CHANGE OR REMOVAL OF SURGICAL WOUND DRESSING: ICD-10-CM

## 2018-08-17 DIAGNOSIS — R60.0 LOCALIZED EDEMA: ICD-10-CM

## 2018-08-17 PROBLEM — Z85.828 PERSONAL HISTORY OF OTHER MALIGNANT NEOPLASM OF SKIN: Status: ACTIVE | Noted: 2018-08-17

## 2018-08-17 PROBLEM — I25.10 ATHEROSCLEROTIC HEART DISEASE OF NATIVE CORONARY ARTERY WITHOUT ANGINA PECTORIS: Status: ACTIVE | Noted: 2018-08-17

## 2018-08-17 PROCEDURE — 29580 STRAPPING UNNA BOOT: CPT | Mod: RT

## 2018-08-17 PROCEDURE — 99211 OFF/OP EST MAY X REQ PHY/QHP: CPT | Mod: 24,25

## 2018-08-17 PROCEDURE — ? DRESSING CHANGE

## 2018-08-17 PROCEDURE — ? UNNA BOOT APPLICATION

## 2018-08-17 ASSESSMENT — LOCATION DETAILED DESCRIPTION DERM
LOCATION DETAILED: RIGHT DISTAL PRETIBIAL REGION
LOCATION DETAILED: RIGHT MEDIAL DISTAL PRETIBIAL REGION
LOCATION DETAILED: RIGHT PROXIMAL CALF

## 2018-08-17 ASSESSMENT — LOCATION SIMPLE DESCRIPTION DERM
LOCATION SIMPLE: RIGHT CALF
LOCATION SIMPLE: RIGHT PRETIBIAL REGION

## 2018-08-17 ASSESSMENT — LOCATION ZONE DERM: LOCATION ZONE: LEG

## 2018-08-17 NOTE — PROCEDURE: UNNA BOOT APPLICATION
After Unna Boot Application Text: Coban was used to wrap the outside of Unna Boot and we ensured the Unna Boot wasn't too tight prior to the patient leaving the office.
Medication Occluded Under Unnaboot: Bactroban
Detail Level: Detailed
Was A Previous Unna Boot Removed?: No
Removal Of Previous Unna Boot (No) Text: The site was cleaned in preparation for an Unna Boot application.
Location Applied: the right leg
Removal Of Previous Unna Boot (Yes) Text: The previous Unna Boot and then the site was cleaned in preparation for another Unna Boot application.
Indication: lymphedema

## 2018-08-17 NOTE — PROCEDURE: DRESSING CHANGE
Detail Level: Detailed
Wound Evaluated By: Marilin Fitzgerald CDT
Add 54134 Cpt? (Important Note: In 2017 The Use Of 27628 Is Being Tracked By Cms To Determine Future Global Period Reimbursement For Global Periods): no
Body Location Override (Optional - Billing Will Still Be Based On Selected Body Map Location If Applicable): right medial pretibial
Body Location Override (Optional - Billing Will Still Be Based On Selected Body Map Location If Applicable): right calf
Wound Evaluated By: Marilin Fitzgerald CDT

## 2018-08-20 ENCOUNTER — APPOINTMENT (RX ONLY)
Dept: URBAN - METROPOLITAN AREA CLINIC 343 | Facility: CLINIC | Age: 83
Setting detail: DERMATOLOGY
End: 2018-08-20

## 2018-08-20 DIAGNOSIS — R60.0 LOCALIZED EDEMA: ICD-10-CM

## 2018-08-20 DIAGNOSIS — Z48.01 ENCOUNTER FOR CHANGE OR REMOVAL OF SURGICAL WOUND DRESSING: ICD-10-CM

## 2018-08-20 PROBLEM — L57.0 ACTINIC KERATOSIS: Status: ACTIVE | Noted: 2018-08-20

## 2018-08-20 PROCEDURE — 99211 OFF/OP EST MAY X REQ PHY/QHP: CPT | Mod: 24,25

## 2018-08-20 PROCEDURE — ? DRESSING CHANGE

## 2018-08-20 PROCEDURE — 29580 STRAPPING UNNA BOOT: CPT | Mod: RT

## 2018-08-20 PROCEDURE — ? UNNA BOOT APPLICATION

## 2018-08-20 ASSESSMENT — LOCATION SIMPLE DESCRIPTION DERM
LOCATION SIMPLE: RIGHT CALF
LOCATION SIMPLE: RIGHT ANKLE

## 2018-08-20 ASSESSMENT — LOCATION ZONE DERM: LOCATION ZONE: LEG

## 2018-08-20 ASSESSMENT — LOCATION DETAILED DESCRIPTION DERM
LOCATION DETAILED: RIGHT PROXIMAL CALF
LOCATION DETAILED: RIGHT POSTERIOR ANKLE

## 2018-08-20 NOTE — PROCEDURE: DRESSING CHANGE
Add 95547 Cpt? (Important Note: In 2017 The Use Of 28139 Is Being Tracked By Cms To Determine Future Global Period Reimbursement For Global Periods): no
Wound Evaluated By: Telma Joseph MA
Wound Evaluated By: Telma Joseph MA
Detail Level: Simple

## 2018-08-23 ENCOUNTER — APPOINTMENT (RX ONLY)
Dept: URBAN - METROPOLITAN AREA CLINIC 343 | Facility: CLINIC | Age: 83
Setting detail: DERMATOLOGY
End: 2018-08-23

## 2018-08-23 DIAGNOSIS — Z48.01 ENCOUNTER FOR CHANGE OR REMOVAL OF SURGICAL WOUND DRESSING: ICD-10-CM

## 2018-08-23 DIAGNOSIS — R60.0 LOCALIZED EDEMA: ICD-10-CM

## 2018-08-23 PROCEDURE — 29580 STRAPPING UNNA BOOT: CPT | Mod: RT

## 2018-08-23 PROCEDURE — ? UNNA BOOT APPLICATION

## 2018-08-23 PROCEDURE — ? DRESSING CHANGE

## 2018-08-23 PROCEDURE — 99211 OFF/OP EST MAY X REQ PHY/QHP: CPT | Mod: 24,25

## 2018-08-23 ASSESSMENT — LOCATION DETAILED DESCRIPTION DERM
LOCATION DETAILED: RIGHT PROXIMAL PRETIBIAL REGION
LOCATION DETAILED: RIGHT MEDIAL DISTAL PRETIBIAL REGION

## 2018-08-23 ASSESSMENT — LOCATION ZONE DERM: LOCATION ZONE: LEG

## 2018-08-23 ASSESSMENT — LOCATION SIMPLE DESCRIPTION DERM: LOCATION SIMPLE: RIGHT PRETIBIAL REGION

## 2018-08-23 NOTE — PROCEDURE: DRESSING CHANGE
Body Location Override (Optional - Billing Will Still Be Based On Selected Body Map Location If Applicable): right medial pretibial
Wound Evaluated By: Blanchard Brown MA
Add 10709 Cpt? (Important Note: In 2017 The Use Of 05458 Is Being Tracked By Cms To Determine Future Global Period Reimbursement For Global Periods): no
Body Location Override (Optional - Billing Will Still Be Based On Selected Body Map Location If Applicable): right calf
Detail Level: Simple
Wound Evaluated By: Blanchard Brown MA

## 2018-08-23 NOTE — PROCEDURE: UNNA BOOT APPLICATION
After Unna Boot Application Text: Coban was used to wrap the outside of Unna Boot and we ensured the Unna Boot wasn't too tight prior to the patient leaving the office.
Was A Previous Unna Boot Removed?: No
Removal Of Previous Unna Boot (Yes) Text: The previous Unna Boot and then the site was cleaned in preparation for another Unna Boot application.
Location Applied: the right leg
Removal Of Previous Unna Boot (No) Text: The site was cleaned in preparation for an Unna Boot application.
Detail Level: Detailed
Medication Occluded Under Unnaboot: Bactroban
Indication: post-operative immobilization

## 2018-08-27 ENCOUNTER — APPOINTMENT (RX ONLY)
Dept: URBAN - METROPOLITAN AREA CLINIC 343 | Facility: CLINIC | Age: 83
Setting detail: DERMATOLOGY
End: 2018-08-27

## 2018-08-27 DIAGNOSIS — L57.0 ACTINIC KERATOSIS: ICD-10-CM

## 2018-08-27 DIAGNOSIS — Z48.02 ENCOUNTER FOR REMOVAL OF SUTURES: ICD-10-CM

## 2018-08-27 PROBLEM — E78.5 HYPERLIPIDEMIA, UNSPECIFIED: Status: ACTIVE | Noted: 2018-08-27

## 2018-08-27 PROBLEM — I25.10 ATHEROSCLEROTIC HEART DISEASE OF NATIVE CORONARY ARTERY WITHOUT ANGINA PECTORIS: Status: ACTIVE | Noted: 2018-08-27

## 2018-08-27 PROBLEM — Z85.828 PERSONAL HISTORY OF OTHER MALIGNANT NEOPLASM OF SKIN: Status: ACTIVE | Noted: 2018-08-27

## 2018-08-27 PROBLEM — D04.4 CARCINOMA IN SITU OF SKIN OF SCALP AND NECK: Status: ACTIVE | Noted: 2018-08-27

## 2018-08-27 PROCEDURE — ? LIQUID NITROGEN

## 2018-08-27 PROCEDURE — ? SUTURE REMOVAL (GLOBAL PERIOD)

## 2018-08-27 PROCEDURE — ? CURETTAGE AND DESTRUCTION

## 2018-08-27 PROCEDURE — 17272 DSTR MAL LES S/N/H/F/G 1.1-2: CPT | Mod: 59

## 2018-08-27 PROCEDURE — 17000 DESTRUCT PREMALG LESION: CPT

## 2018-08-27 ASSESSMENT — LOCATION SIMPLE DESCRIPTION DERM
LOCATION SIMPLE: RIGHT CALF
LOCATION SIMPLE: RIGHT PRETIBIAL REGION
LOCATION SIMPLE: RIGHT UPPER BACK

## 2018-08-27 ASSESSMENT — LOCATION DETAILED DESCRIPTION DERM
LOCATION DETAILED: RIGHT PROXIMAL PRETIBIAL REGION
LOCATION DETAILED: RIGHT DISTAL CALF
LOCATION DETAILED: RIGHT SUPERIOR MEDIAL UPPER BACK

## 2018-08-27 ASSESSMENT — LOCATION ZONE DERM
LOCATION ZONE: TRUNK
LOCATION ZONE: LEG

## 2018-08-27 NOTE — PROCEDURE: LIQUID NITROGEN
Post-Care Instructions: I reviewed with the patient in detail post-care instructions. Patient is to wear sunprotection, and avoid picking at any of the treated lesions. Pt may apply Vaseline to crusted or scabbing areas.
Number Of Freeze-Thaw Cycles: 1 freeze-thaw cycle
Consent: The patient's consent was obtained including but not limited to risks of crusting, scabbing, blistering, scarring, darker or lighter pigmentary change, recurrence, incomplete removal and infection.
Render Post-Care Instructions In Note?: no
Detail Level: Simple
Duration Of Freeze Thaw-Cycle (Seconds): 3

## 2018-08-27 NOTE — PROCEDURE: CURETTAGE AND DESTRUCTION
Number Of Curettages: 3
Detail Level: Detailed
Anesthesia Type: 2% lidocaine with epinephrine
Bill As A Line Item Or As Units: Line Item
Size Of Lesion After Curettage: 1.2
Size Of Lesion In Cm: 0.8
Concentration (Mg/Ml Or Millions Of Plaque Forming Units/Cc): 0.01
Body Location Override (Optional - Billing Will Still Be Based On Selected Body Map Location If Applicable): left neck
What Was Performed First?: Curettage
Cautery Type: electrodesiccation
Render Post-Care Instructions In Note?: yes
Additional Information: (Optional): The wound was cleaned, Aquaphor ointment was applied a pressure dressing. The patient received detailed verbal and written post-op instructions.
Total Volume (Ccs): 1
Post-Care Instructions: I reviewed with the patient in detail post-care instructions. Patient is to keep the area dry for 48 hours, and not to engage in any swimming until the area is healed. Should the patient develop any fevers, chills, bleeding, severe pain patient will contact the office immediately.
Bill For Surgical Tray: no
Consent was obtained from the patient. The risks, benefits and alternatives to therapy were discussed in detail. Specifically, the risks of infection, scarring, bleeding, prolonged wound healing, nerve injury, incomplete removal, allergy to anesthesia and recurrence were addressed. Alternatives to ED&C, such as: surgical removal and XRT were also discussed.  Prior to the procedure, the treatment site was clearly identified and confirmed by the patient. All components of Universal Protocol/PAUSE Rule completed.

## 2018-08-27 NOTE — PROCEDURE: SUTURE REMOVAL (GLOBAL PERIOD)
Add 61888 Cpt? (Important Note: In 2017 The Use Of 64099 Is Being Tracked By Cms To Determine Future Global Period Reimbursement For Global Periods): no
Detail Level: Detailed
Body Location Override (Optional - Billing Will Still Be Based On Selected Body Map Location If Applicable): right calf
Body Location Override (Optional - Billing Will Still Be Based On Selected Body Map Location If Applicable): right medial pretibial

## 2018-11-15 ENCOUNTER — APPOINTMENT (RX ONLY)
Dept: URBAN - METROPOLITAN AREA CLINIC 343 | Facility: CLINIC | Age: 83
Setting detail: DERMATOLOGY
End: 2018-11-15

## 2018-11-15 DIAGNOSIS — L82.1 OTHER SEBORRHEIC KERATOSIS: ICD-10-CM

## 2018-11-15 DIAGNOSIS — L57.0 ACTINIC KERATOSIS: ICD-10-CM

## 2018-11-15 DIAGNOSIS — L81.4 OTHER MELANIN HYPERPIGMENTATION: ICD-10-CM

## 2018-11-15 PROBLEM — Z85.828 PERSONAL HISTORY OF OTHER MALIGNANT NEOPLASM OF SKIN: Status: ACTIVE | Noted: 2018-11-15

## 2018-11-15 PROBLEM — D48.5 NEOPLASM OF UNCERTAIN BEHAVIOR OF SKIN: Status: ACTIVE | Noted: 2018-11-15

## 2018-11-15 PROBLEM — I25.10 ATHEROSCLEROTIC HEART DISEASE OF NATIVE CORONARY ARTERY WITHOUT ANGINA PECTORIS: Status: ACTIVE | Noted: 2018-11-15

## 2018-11-15 PROBLEM — E78.5 HYPERLIPIDEMIA, UNSPECIFIED: Status: ACTIVE | Noted: 2018-11-15

## 2018-11-15 PROCEDURE — ? BIOPSY BY SHAVE METHOD

## 2018-11-15 PROCEDURE — ? COUNSELING

## 2018-11-15 PROCEDURE — 11100: CPT | Mod: 59

## 2018-11-15 PROCEDURE — 99213 OFFICE O/P EST LOW 20 MIN: CPT | Mod: 25

## 2018-11-15 PROCEDURE — 17000 DESTRUCT PREMALG LESION: CPT

## 2018-11-15 PROCEDURE — 11101: CPT

## 2018-11-15 PROCEDURE — 17003 DESTRUCT PREMALG LES 2-14: CPT

## 2018-11-15 PROCEDURE — ? LIQUID NITROGEN

## 2018-11-15 ASSESSMENT — LOCATION SIMPLE DESCRIPTION DERM
LOCATION SIMPLE: LEFT FOREARM
LOCATION SIMPLE: RIGHT THIGH
LOCATION SIMPLE: RIGHT UPPER ARM
LOCATION SIMPLE: RIGHT FOREARM
LOCATION SIMPLE: RIGHT PRETIBIAL REGION
LOCATION SIMPLE: LEFT HAND
LOCATION SIMPLE: RIGHT ELBOW
LOCATION SIMPLE: LEFT PRETIBIAL REGION
LOCATION SIMPLE: RIGHT CHEEK
LOCATION SIMPLE: LEFT CHEEK

## 2018-11-15 ASSESSMENT — LOCATION DETAILED DESCRIPTION DERM
LOCATION DETAILED: RIGHT PROXIMAL PRETIBIAL REGION
LOCATION DETAILED: RIGHT ANTERIOR DISTAL THIGH
LOCATION DETAILED: LEFT PROXIMAL DORSAL FOREARM
LOCATION DETAILED: LEFT DISTAL DORSAL FOREARM
LOCATION DETAILED: RIGHT LATERAL MALAR CHEEK
LOCATION DETAILED: RIGHT SUPERIOR LATERAL MALAR CHEEK
LOCATION DETAILED: RIGHT ELBOW
LOCATION DETAILED: RIGHT DISTAL POSTERIOR UPPER ARM
LOCATION DETAILED: RIGHT PROXIMAL DORSAL FOREARM
LOCATION DETAILED: LEFT CENTRAL MALAR CHEEK
LOCATION DETAILED: LEFT PROXIMAL PRETIBIAL REGION
LOCATION DETAILED: LEFT ULNAR DORSAL HAND

## 2018-11-15 ASSESSMENT — LOCATION ZONE DERM
LOCATION ZONE: LEG
LOCATION ZONE: HAND
LOCATION ZONE: ARM
LOCATION ZONE: FACE

## 2018-11-15 NOTE — PROCEDURE: LIQUID NITROGEN
Consent: The patient's consent was obtained including but not limited to risks of crusting, scabbing, blistering, scarring, darker or lighter pigmentary change, recurrence, incomplete removal and infection.
Number Of Freeze-Thaw Cycles: 3 freeze-thaw cycles
Render Post-Care Instructions In Note?: no
Detail Level: Simple
Post-Care Instructions: I reviewed with the patient in detail post-care instructions. Patient is to wear sunprotection, and avoid picking at any of the treated lesions. Pt may apply Vaseline to crusted or scabbing areas.
Duration Of Freeze Thaw-Cycle (Seconds): 3

## 2019-01-01 ENCOUNTER — APPOINTMENT (RX ONLY)
Dept: URBAN - METROPOLITAN AREA CLINIC 153 | Facility: CLINIC | Age: 84
Setting detail: DERMATOLOGY
End: 2019-01-01

## 2019-01-01 DIAGNOSIS — Z48.02 ENCOUNTER FOR REMOVAL OF SUTURES: ICD-10-CM

## 2019-01-01 DIAGNOSIS — L57.0 ACTINIC KERATOSIS: ICD-10-CM

## 2019-01-01 DIAGNOSIS — S0182XA OPEN WOUND OF OTHER AND MULTIPLE SITES OF FACE, COMPLICATED: ICD-10-CM

## 2019-01-01 DIAGNOSIS — L82.1 OTHER SEBORRHEIC KERATOSIS: ICD-10-CM

## 2019-01-01 DIAGNOSIS — B07.8 OTHER VIRAL WARTS: ICD-10-CM

## 2019-01-01 DIAGNOSIS — D485 NEOPLASM OF UNCERTAIN BEHAVIOR OF SKIN: ICD-10-CM

## 2019-01-01 DIAGNOSIS — L82.0 INFLAMED SEBORRHEIC KERATOSIS: ICD-10-CM

## 2019-01-01 DIAGNOSIS — L81.4 OTHER MELANIN HYPERPIGMENTATION: ICD-10-CM

## 2019-01-01 DIAGNOSIS — Z48.817 ENCOUNTER FOR SURGICAL AFTERCARE FOLLOWING SURGERY ON THE SKIN AND SUBCUTANEOUS TISSUE: ICD-10-CM

## 2019-01-01 PROCEDURE — 99213 OFFICE O/P EST LOW 20 MIN: CPT | Mod: 24,25

## 2019-01-01 PROCEDURE — ? SUTURE REMOVAL (GLOBAL PERIOD)

## 2019-01-01 PROCEDURE — ? COUNSELING

## 2019-01-01 PROCEDURE — 99024 POSTOP FOLLOW-UP VISIT: CPT

## 2019-01-01 PROCEDURE — ? LIQUID NITROGEN

## 2019-01-01 PROCEDURE — 17311 MOHS 1 STAGE H/N/HF/G: CPT

## 2019-01-01 PROCEDURE — 17313 MOHS 1 STAGE T/A/L: CPT

## 2019-01-01 PROCEDURE — 13121 CMPLX RPR S/A/L 2.6-7.5 CM: CPT | Mod: 79

## 2019-01-01 PROCEDURE — ? EXCISION

## 2019-01-01 PROCEDURE — ? TREATMENT REGIMEN

## 2019-01-01 PROCEDURE — 17000 DESTRUCT PREMALG LESION: CPT

## 2019-01-01 PROCEDURE — 13121 CMPLX RPR S/A/L 2.6-7.5 CM: CPT

## 2019-01-01 PROCEDURE — ? CONSULTATION FOR MOHS SURGERY

## 2019-01-01 PROCEDURE — 11103 TANGNTL BX SKIN EA SEP/ADDL: CPT

## 2019-01-01 PROCEDURE — ? BIOPSY BY SHAVE METHOD

## 2019-01-01 PROCEDURE — 99213 OFFICE O/P EST LOW 20 MIN: CPT | Mod: 24

## 2019-01-01 PROCEDURE — ? PRESCRIPTION

## 2019-01-01 PROCEDURE — ? MOHS SURGERY

## 2019-01-01 PROCEDURE — 11603 EXC TR-EXT MAL+MARG 2.1-3 CM: CPT

## 2019-01-01 PROCEDURE — 17110 DESTRUCTION B9 LES UP TO 14: CPT

## 2019-01-01 PROCEDURE — 99213 OFFICE O/P EST LOW 20 MIN: CPT | Mod: 25

## 2019-01-01 PROCEDURE — 13132 CMPLX RPR F/C/C/M/N/AX/G/H/F: CPT

## 2019-01-01 PROCEDURE — ? CAUTERY

## 2019-01-01 PROCEDURE — 11103 TANGNTL BX SKIN EA SEP/ADDL: CPT | Mod: 79

## 2019-01-01 PROCEDURE — 11102 TANGNTL BX SKIN SINGLE LES: CPT

## 2019-01-01 PROCEDURE — 11102 TANGNTL BX SKIN SINGLE LES: CPT | Mod: 79

## 2019-01-01 PROCEDURE — ? DEFER

## 2019-01-01 PROCEDURE — 17313 MOHS 1 STAGE T/A/L: CPT | Mod: 79

## 2019-01-01 RX ORDER — MUPIROCIN 20 MG/G
1 OINTMENT TOPICAL BID
Qty: 1 | Refills: 1 | Status: ERX

## 2019-01-01 RX ORDER — MUPIROCIN 20 MG/G
OINTMENT TOPICAL BID
Qty: 1 | Refills: 0 | Status: ERX

## 2019-01-01 RX ORDER — DOXYCYCLINE 100 MG/1
CAPSULE ORAL BID
Qty: 20 | Refills: 0 | Status: ERX | COMMUNITY
Start: 2019-01-01

## 2019-01-01 RX ORDER — MUPIROCIN 20 MG/G
OINTMENT TOPICAL
Qty: 1 | Refills: 0 | Status: ERX | COMMUNITY
Start: 2019-01-01

## 2019-01-01 RX ADMIN — MUPIROCIN 1: 20 OINTMENT TOPICAL at 00:00

## 2019-01-01 RX ADMIN — DOXYCYCLINE 1: 100 CAPSULE ORAL at 00:00

## 2019-01-01 ASSESSMENT — LOCATION DETAILED DESCRIPTION DERM
LOCATION DETAILED: LEFT RADIAL DORSAL HAND
LOCATION DETAILED: RIGHT PROXIMAL DORSAL FOREARM
LOCATION DETAILED: LEFT PROXIMAL DORSAL FOREARM
LOCATION DETAILED: RIGHT PROXIMAL DORSAL FOREARM
LOCATION DETAILED: LEFT PROXIMAL PRETIBIAL REGION
LOCATION DETAILED: LEFT CENTRAL MALAR CHEEK
LOCATION DETAILED: RIGHT CENTRAL MALAR CHEEK
LOCATION DETAILED: RIGHT KNEE
LOCATION DETAILED: LEFT DISTAL RADIAL DORSAL FOREARM
LOCATION DETAILED: RIGHT MEDIAL KNEE
LOCATION DETAILED: LEFT LATERAL POSTERIOR ANKLE
LOCATION DETAILED: LEFT PROXIMAL DORSAL FOREARM
LOCATION DETAILED: LEFT DISTAL ULNAR DORSAL FOREARM
LOCATION DETAILED: RIGHT PROXIMAL PRETIBIAL REGION
LOCATION DETAILED: LEFT DISTAL DORSAL FOREARM
LOCATION DETAILED: LEFT SUPERIOR LATERAL MALAR CHEEK
LOCATION DETAILED: LEFT DISTAL DORSAL FOREARM

## 2019-01-01 ASSESSMENT — LOCATION ZONE DERM
LOCATION ZONE: ARM
LOCATION ZONE: FACE
LOCATION ZONE: ARM
LOCATION ZONE: FACE
LOCATION ZONE: HAND
LOCATION ZONE: ARM
LOCATION ZONE: LEG
LOCATION ZONE: LEG
LOCATION ZONE: ARM
LOCATION ZONE: LEG
LOCATION ZONE: ARM
LOCATION ZONE: LEG
LOCATION ZONE: ARM

## 2019-01-01 ASSESSMENT — LOCATION SIMPLE DESCRIPTION DERM
LOCATION SIMPLE: RIGHT CHEEK
LOCATION SIMPLE: LEFT FOREARM
LOCATION SIMPLE: LEFT ANKLE
LOCATION SIMPLE: RIGHT KNEE
LOCATION SIMPLE: LEFT HAND
LOCATION SIMPLE: RIGHT KNEE
LOCATION SIMPLE: LEFT PRETIBIAL REGION
LOCATION SIMPLE: LEFT CHEEK
LOCATION SIMPLE: RIGHT PRETIBIAL REGION
LOCATION SIMPLE: RIGHT FOREARM
LOCATION SIMPLE: LEFT CHEEK
LOCATION SIMPLE: LEFT FOREARM
LOCATION SIMPLE: RIGHT FOREARM

## 2019-01-04 ENCOUNTER — APPOINTMENT (RX ONLY)
Dept: URBAN - METROPOLITAN AREA CLINIC 343 | Facility: CLINIC | Age: 84
Setting detail: DERMATOLOGY
End: 2019-01-04

## 2019-01-04 DIAGNOSIS — L82.1 OTHER SEBORRHEIC KERATOSIS: ICD-10-CM

## 2019-01-04 PROBLEM — Z85.828 PERSONAL HISTORY OF OTHER MALIGNANT NEOPLASM OF SKIN: Status: ACTIVE | Noted: 2019-01-04

## 2019-01-04 PROBLEM — D48.5 NEOPLASM OF UNCERTAIN BEHAVIOR OF SKIN: Status: ACTIVE | Noted: 2019-01-04

## 2019-01-04 PROBLEM — I25.10 ATHEROSCLEROTIC HEART DISEASE OF NATIVE CORONARY ARTERY WITHOUT ANGINA PECTORIS: Status: ACTIVE | Noted: 2019-01-04

## 2019-01-04 PROBLEM — C44.722 SQUAMOUS CELL CARCINOMA OF SKIN OF RIGHT LOWER LIMB, INCLUDING HIP: Status: ACTIVE | Noted: 2019-01-04

## 2019-01-04 PROCEDURE — ? EXCISION

## 2019-01-04 PROCEDURE — 12032 INTMD RPR S/A/T/EXT 2.6-7.5: CPT | Mod: 59

## 2019-01-04 PROCEDURE — ? BIOPSY BY SHAVE METHOD

## 2019-01-04 PROCEDURE — 99212 OFFICE O/P EST SF 10 MIN: CPT | Mod: 25

## 2019-01-04 PROCEDURE — ? COUNSELING

## 2019-01-04 PROCEDURE — 11102 TANGNTL BX SKIN SINGLE LES: CPT | Mod: 59

## 2019-01-04 PROCEDURE — ? PRESCRIPTION

## 2019-01-04 PROCEDURE — 11603 EXC TR-EXT MAL+MARG 2.1-3 CM: CPT

## 2019-01-04 PROCEDURE — 11103 TANGNTL BX SKIN EA SEP/ADDL: CPT

## 2019-01-04 RX ORDER — MUPIROCIN 20 MG/G
OINTMENT TOPICAL BID
Qty: 1 | Refills: 1 | Status: CANCELLED
Stop reason: NON-AVAIL

## 2019-01-04 ASSESSMENT — LOCATION SIMPLE DESCRIPTION DERM
LOCATION SIMPLE: LEFT FOREARM
LOCATION SIMPLE: RIGHT FOREARM

## 2019-01-04 ASSESSMENT — LOCATION DETAILED DESCRIPTION DERM
LOCATION DETAILED: LEFT DISTAL DORSAL FOREARM
LOCATION DETAILED: RIGHT PROXIMAL DORSAL FOREARM

## 2019-01-04 ASSESSMENT — LOCATION ZONE DERM: LOCATION ZONE: ARM

## 2019-01-04 NOTE — PROCEDURE: BIOPSY BY SHAVE METHOD
Body Location Override (Optional - Billing Will Still Be Based On Selected Body Map Location If Applicable): left dorsal forearm near elbow

## 2019-01-04 NOTE — PROCEDURE: BIOPSY BY SHAVE METHOD
Body Location Override (Optional - Billing Will Still Be Based On Selected Body Map Location If Applicable): left upper forearm near elbow

## 2019-01-04 NOTE — PROCEDURE: EXCISION
Path Notes (To The Dermatopathologist): Please check margins. Tagged at 12 o’clock \\nAccession # [id number] Path Notes (To The Dermatopathologist): Please check margins. Tagged at 12 o’clock \\nAccession # [id number]

## 2019-01-18 ENCOUNTER — APPOINTMENT (RX ONLY)
Dept: URBAN - METROPOLITAN AREA CLINIC 153 | Facility: CLINIC | Age: 84
Setting detail: DERMATOLOGY
End: 2019-01-18

## 2019-01-18 ENCOUNTER — APPOINTMENT (RX ONLY)
Dept: URBAN - METROPOLITAN AREA CLINIC 343 | Facility: CLINIC | Age: 84
Setting detail: DERMATOLOGY
End: 2019-01-18

## 2019-01-18 PROBLEM — L57.0 ACTINIC KERATOSIS: Status: ACTIVE | Noted: 2019-01-18

## 2019-01-18 PROBLEM — E78.5 HYPERLIPIDEMIA, UNSPECIFIED: Status: ACTIVE | Noted: 2019-01-18

## 2019-01-18 PROBLEM — C44.622 SQUAMOUS CELL CARCINOMA OF SKIN OF RIGHT UPPER LIMB, INCLUDING SHOULDER: Status: ACTIVE | Noted: 2019-01-18

## 2019-01-18 PROBLEM — Z85.828 PERSONAL HISTORY OF OTHER MALIGNANT NEOPLASM OF SKIN: Status: ACTIVE | Noted: 2019-01-18

## 2019-01-18 PROBLEM — I25.10 ATHEROSCLEROTIC HEART DISEASE OF NATIVE CORONARY ARTERY WITHOUT ANGINA PECTORIS: Status: ACTIVE | Noted: 2019-01-18

## 2019-01-18 PROCEDURE — 13121 CMPLX RPR S/A/L 2.6-7.5 CM: CPT

## 2019-01-18 PROCEDURE — ? PRESCRIPTION

## 2019-01-18 PROCEDURE — 11603 EXC TR-EXT MAL+MARG 2.1-3 CM: CPT

## 2019-01-18 PROCEDURE — ? EXCISION

## 2019-01-18 RX ORDER — MUPIROCIN 20 MG/G
OINTMENT TOPICAL BID
Qty: 1 | Refills: 0 | Status: ERX | COMMUNITY
Start: 2019-01-18

## 2019-01-18 RX ADMIN — MUPIROCIN 1: 20 OINTMENT TOPICAL at 00:00

## 2019-01-18 NOTE — HPI: OTHER
Condition:: F/U Well Differentiated Squamous Cell Carcinoma evaluated on January 4, 2019
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 He was seen on January 4, 2019, at which time Excision (Repair: Intermediate) was performed and he was prescribed Mupirocin 2 % topical ointment BID (apply twice daily to surgical site on right thigh). The pathology shows: No Residual Squamous Cell Carcinoma on the right anterior distal thigh. We recommended the following: Reassure : margins clear.\\n\\nThe patient is now here for suture removal.
Condition:: F/U Neoplasm of Uncertain Behavior evaluated on November 15, 2018
Please Describe Your Condition:: is following up for neoplasm of uncertain behavior on the right dorsal forearm. He was seen on November 15, 2018, at which time Biopsy by Shave Method was performed. The pathology shows: Well Differentiated Squamous Cell Carcinoma on the right dorsal forearm. We recommended the following: Schedule : Excision.\\n\\nThe patient is now here for excision.

## 2019-01-18 NOTE — PROCEDURE: EXCISION
Path Notes (To The Dermatopathologist): Please check margins. Tagged at 12 O'clock superior\\n Accession # [id number] Path Notes (To The Dermatopathologist): Please check margins. Tagged at 12 O'clock superior\\n Accession # [id number]

## 2019-01-21 ENCOUNTER — APPOINTMENT (RX ONLY)
Dept: URBAN - METROPOLITAN AREA CLINIC 343 | Facility: CLINIC | Age: 84
Setting detail: DERMATOLOGY
End: 2019-01-21

## 2019-01-21 ENCOUNTER — APPOINTMENT (RX ONLY)
Dept: URBAN - METROPOLITAN AREA CLINIC 153 | Facility: CLINIC | Age: 84
Setting detail: DERMATOLOGY
End: 2019-01-21

## 2019-01-21 DIAGNOSIS — D485 NEOPLASM OF UNCERTAIN BEHAVIOR OF SKIN: ICD-10-CM

## 2019-01-21 PROBLEM — C44.629 SQUAMOUS CELL CARCINOMA OF SKIN OF LEFT UPPER LIMB, INCLUDING SHOULDER: Status: ACTIVE | Noted: 2019-01-21

## 2019-01-21 PROBLEM — D48.5 NEOPLASM OF UNCERTAIN BEHAVIOR OF SKIN: Status: ACTIVE | Noted: 2019-01-21

## 2019-01-21 PROCEDURE — 99212 OFFICE O/P EST SF 10 MIN: CPT | Mod: 24,25

## 2019-01-21 PROCEDURE — ? EXCISION

## 2019-01-21 PROCEDURE — ? DEFER

## 2019-01-21 PROCEDURE — 13121 CMPLX RPR S/A/L 2.6-7.5 CM: CPT | Mod: 79

## 2019-01-21 PROCEDURE — 11603 EXC TR-EXT MAL+MARG 2.1-3 CM: CPT | Mod: 79

## 2019-01-21 ASSESSMENT — LOCATION ZONE DERM: LOCATION ZONE: HAND

## 2019-01-21 ASSESSMENT — LOCATION SIMPLE DESCRIPTION DERM: LOCATION SIMPLE: LEFT HAND

## 2019-01-21 ASSESSMENT — LOCATION DETAILED DESCRIPTION DERM: LOCATION DETAILED: LEFT RADIAL DORSAL HAND

## 2019-01-21 NOTE — PROCEDURE: EXCISION
Path Notes (To The Dermatopathologist): Please check margins. Tagged at 12 O'Clock. Accession # [id number] Path Notes (To The Dermatopathologist): Please check margins. Tagged at 12 O'Clock. Accession # [id number]

## 2019-01-21 NOTE — PROCEDURE: DEFER
Detail Level: Detailed
Instructions (Optional): Will biopsy lesion at suture removal apt on 2/7/19. Picture taken today.
Procedure To Be Performed At Next Visit: Biopsy by shave method

## 2019-01-21 NOTE — HPI: OTHER
Condition:: F/U Neoplasm of Uncertain Behavior evaluated on January 4, 2019
Please Describe Your Condition:: is following up for neoplasm of uncertain behavior on the left dorsal forearm near elbow. He was seen on January 4, 2019, at which time Biopsy by Shave Method was performed. The pathology shows: Squamous cell carcinoma, keratoacanthomatous, well-differentiated. We recommended the following: Schedule : Excision.\\n\\nThe patient is now here for excision.

## 2019-02-01 ENCOUNTER — APPOINTMENT (RX ONLY)
Dept: URBAN - METROPOLITAN AREA CLINIC 153 | Facility: CLINIC | Age: 84
Setting detail: DERMATOLOGY
End: 2019-02-01

## 2019-02-01 DIAGNOSIS — Z48.02 ENCOUNTER FOR REMOVAL OF SUTURES: ICD-10-CM

## 2019-02-01 PROCEDURE — 99024 POSTOP FOLLOW-UP VISIT: CPT

## 2019-02-01 PROCEDURE — ? SUTURE REMOVAL (GLOBAL PERIOD)

## 2019-02-01 ASSESSMENT — LOCATION SIMPLE DESCRIPTION DERM: LOCATION SIMPLE: RIGHT FOREARM

## 2019-02-01 ASSESSMENT — LOCATION DETAILED DESCRIPTION DERM: LOCATION DETAILED: RIGHT DISTAL DORSAL FOREARM

## 2019-02-01 ASSESSMENT — LOCATION ZONE DERM: LOCATION ZONE: ARM

## 2019-02-01 NOTE — PROCEDURE: SUTURE REMOVAL (GLOBAL PERIOD)
Detail Level: Detailed
Body Location Override (Optional - Billing Will Still Be Based On Selected Body Map Location If Applicable): right dorsal forearm
Add 39827 Cpt? (Important Note: In 2017 The Use Of 13472 Is Being Tracked By Cms To Determine Future Global Period Reimbursement For Global Periods): yes

## 2019-03-19 ENCOUNTER — APPOINTMENT (RX ONLY)
Dept: URBAN - METROPOLITAN AREA CLINIC 153 | Facility: CLINIC | Age: 84
Setting detail: DERMATOLOGY
End: 2019-03-19

## 2019-03-19 DIAGNOSIS — L82.1 OTHER SEBORRHEIC KERATOSIS: ICD-10-CM

## 2019-03-19 DIAGNOSIS — L57.0 ACTINIC KERATOSIS: ICD-10-CM

## 2019-03-19 DIAGNOSIS — L81.4 OTHER MELANIN HYPERPIGMENTATION: ICD-10-CM

## 2019-03-19 PROBLEM — D48.5 NEOPLASM OF UNCERTAIN BEHAVIOR OF SKIN: Status: ACTIVE | Noted: 2019-03-19

## 2019-03-19 PROCEDURE — ? BIOPSY BY SHAVE METHOD

## 2019-03-19 PROCEDURE — 11102 TANGNTL BX SKIN SINGLE LES: CPT | Mod: 59

## 2019-03-19 PROCEDURE — ? PRESCRIPTION

## 2019-03-19 PROCEDURE — 17004 DESTROY PREMAL LESIONS 15/>: CPT

## 2019-03-19 PROCEDURE — 11103 TANGNTL BX SKIN EA SEP/ADDL: CPT

## 2019-03-19 PROCEDURE — ? COUNSELING

## 2019-03-19 PROCEDURE — ? LIQUID NITROGEN

## 2019-03-19 PROCEDURE — 99213 OFFICE O/P EST LOW 20 MIN: CPT | Mod: 25

## 2019-03-19 PROCEDURE — ? TREATMENT REGIMEN

## 2019-03-19 RX ORDER — INGENOL MEBUTATE 500 UG/G
GEL TOPICAL
Qty: 1 | Refills: 0 | Status: ERX | COMMUNITY
Start: 2019-03-19

## 2019-03-19 RX ADMIN — INGENOL MEBUTATE 1: 500 GEL TOPICAL at 00:00

## 2019-03-19 ASSESSMENT — LOCATION DETAILED DESCRIPTION DERM
LOCATION DETAILED: RIGHT ULNAR DORSAL HAND
LOCATION DETAILED: RIGHT RADIAL DORSAL HAND
LOCATION DETAILED: LEFT DISTAL DORSAL FOREARM
LOCATION DETAILED: 3RD WEB SPACE LEFT HAND
LOCATION DETAILED: LEFT RADIAL DORSAL HAND
LOCATION DETAILED: LEFT PROXIMAL DORSAL FOREARM
LOCATION DETAILED: RIGHT DISTAL DORSAL FOREARM
LOCATION DETAILED: RIGHT PROXIMAL DORSAL FOREARM
LOCATION DETAILED: LEFT ULNAR DORSAL HAND
LOCATION DETAILED: RIGHT DORSAL WRIST
LOCATION DETAILED: RIGHT DISTAL RADIAL DORSAL FOREARM

## 2019-03-19 ASSESSMENT — LOCATION SIMPLE DESCRIPTION DERM
LOCATION SIMPLE: LEFT FOREARM
LOCATION SIMPLE: LEFT HAND
LOCATION SIMPLE: RIGHT HAND
LOCATION SIMPLE: RIGHT WRIST
LOCATION SIMPLE: RIGHT FOREARM

## 2019-03-19 ASSESSMENT — LOCATION ZONE DERM
LOCATION ZONE: HAND
LOCATION ZONE: ARM

## 2019-03-19 NOTE — PROCEDURE: BIOPSY BY SHAVE METHOD
Body Location Override (Optional - Billing Will Still Be Based On Selected Body Map Location If Applicable): right lateral dorsal forearm

## 2019-03-19 NOTE — PROCEDURE: TREATMENT REGIMEN
Plan: Photo taken, will recheck in 1 week
Initiate Treatment: Picato Gel 0.05% QD to right hand x 2 days
Detail Level: Zone

## 2019-04-16 ENCOUNTER — APPOINTMENT (RX ONLY)
Dept: URBAN - METROPOLITAN AREA CLINIC 153 | Facility: CLINIC | Age: 84
Setting detail: DERMATOLOGY
End: 2019-04-16

## 2019-04-16 PROBLEM — C44.629 SQUAMOUS CELL CARCINOMA OF SKIN OF LEFT UPPER LIMB, INCLUDING SHOULDER: Status: ACTIVE | Noted: 2019-04-16

## 2019-04-16 PROCEDURE — 17311 MOHS 1 STAGE H/N/HF/G: CPT

## 2019-04-16 PROCEDURE — 13132 CMPLX RPR F/C/C/M/N/AX/G/H/F: CPT

## 2019-04-16 PROCEDURE — ? MOHS SURGERY

## 2019-04-16 NOTE — PROCEDURE: MOHS SURGERY
Banner Transposition Flap Text: The defect edges were debeveled with a #15 scalpel blade.  Given the location of the defect and the proximity to free margins a Banner transposition flap was deemed most appropriate.  Using a sterile surgical marker, an appropriate flap drawn around the defect. The area thus outlined was incised deep to adipose tissue with a #15 scalpel blade.  The skin margins were undermined to an appropriate distance in all directions utilizing iris scissors. Full

## 2019-04-17 ENCOUNTER — APPOINTMENT (RX ONLY)
Dept: URBAN - METROPOLITAN AREA CLINIC 153 | Facility: CLINIC | Age: 84
Setting detail: DERMATOLOGY
End: 2019-04-17

## 2019-04-17 DIAGNOSIS — Z48.817 ENCOUNTER FOR SURGICAL AFTERCARE FOLLOWING SURGERY ON THE SKIN AND SUBCUTANEOUS TISSUE: ICD-10-CM

## 2019-04-17 PROCEDURE — 99024 POSTOP FOLLOW-UP VISIT: CPT

## 2019-04-17 PROCEDURE — ? POST-OP WOUND CHECK

## 2019-04-17 ASSESSMENT — LOCATION ZONE DERM: LOCATION ZONE: HAND

## 2019-04-17 ASSESSMENT — LOCATION DETAILED DESCRIPTION DERM: LOCATION DETAILED: LEFT RADIAL DORSAL HAND

## 2019-04-17 ASSESSMENT — LOCATION SIMPLE DESCRIPTION DERM: LOCATION SIMPLE: LEFT HAND

## 2019-04-17 NOTE — PROCEDURE: POST-OP WOUND CHECK
Detail Level: Detailed
Add 05778 Cpt? (Important Note: In 2017 The Use Of 49966 Is Being Tracked By Cms To Determine Future Global Period Reimbursement For Global Periods): yes
Body Location Override (Optional - Billing Will Still Be Based On Selected Body Map Location If Applicable): left dorsal hand
Additional Comments: Surgical site was washed with anti-bacterial soap and water, mupirocin ointment was applied, and non-stick telfa was applied.
Wound Evaluated By: Marilin Fitzgerald CDT

## 2019-04-30 ENCOUNTER — APPOINTMENT (RX ONLY)
Dept: URBAN - METROPOLITAN AREA CLINIC 153 | Facility: CLINIC | Age: 84
Setting detail: DERMATOLOGY
End: 2019-04-30

## 2019-04-30 DIAGNOSIS — Z48.02 ENCOUNTER FOR REMOVAL OF SUTURES: ICD-10-CM

## 2019-04-30 PROBLEM — C44.622 SQUAMOUS CELL CARCINOMA OF SKIN OF RIGHT UPPER LIMB, INCLUDING SHOULDER: Status: ACTIVE | Noted: 2019-04-30

## 2019-04-30 PROBLEM — D48.5 NEOPLASM OF UNCERTAIN BEHAVIOR OF SKIN: Status: ACTIVE | Noted: 2019-04-30

## 2019-04-30 PROCEDURE — ? BIOPSY BY SHAVE METHOD

## 2019-04-30 PROCEDURE — ? EXCISION

## 2019-04-30 PROCEDURE — 11102 TANGNTL BX SKIN SINGLE LES: CPT | Mod: 59

## 2019-04-30 PROCEDURE — ? SUTURE REMOVAL (GLOBAL PERIOD)

## 2019-04-30 PROCEDURE — 13121 CMPLX RPR S/A/L 2.6-7.5 CM: CPT | Mod: 59

## 2019-04-30 PROCEDURE — 11603 EXC TR-EXT MAL+MARG 2.1-3 CM: CPT

## 2019-04-30 PROCEDURE — 99024 POSTOP FOLLOW-UP VISIT: CPT

## 2019-04-30 ASSESSMENT — LOCATION ZONE DERM: LOCATION ZONE: HAND

## 2019-04-30 ASSESSMENT — LOCATION SIMPLE DESCRIPTION DERM: LOCATION SIMPLE: LEFT HAND

## 2019-04-30 ASSESSMENT — LOCATION DETAILED DESCRIPTION DERM: LOCATION DETAILED: LEFT ULNAR DORSAL HAND

## 2019-04-30 NOTE — PROCEDURE: SUTURE REMOVAL (GLOBAL PERIOD)
Add 36397 Cpt? (Important Note: In 2017 The Use Of 07635 Is Being Tracked By Cms To Determine Future Global Period Reimbursement For Global Periods): yes
Body Location Override (Optional - Billing Will Still Be Based On Selected Body Map Location If Applicable): left dorsal hand
Detail Level: Detailed

## 2019-04-30 NOTE — PROCEDURE: EXCISION
Path Notes (To The Dermatopathologist): Please check margins. Tagged superiorly at 12 o'clock.\\nAccession # [id number] Path Notes (To The Dermatopathologist): Please check margins. Tagged superiorly at 12 o'clock.\\nAccession # [id number]

## 2019-04-30 NOTE — PROCEDURE: BIOPSY BY SHAVE METHOD
Electrodesiccation Text: The wound bed was treated with electrodesiccation after the biopsy was performed.
Wound Care: Aquaphor
Billing Type: Third-Party Bill
Size Of Lesion In Cm: 1.5
Render Post-Care Instructions In Note?: no
Additional Anesthesia Volume In Cc (Will Not Render If 0): 0
Cryotherapy Text: The wound bed was treated with cryotherapy after the biopsy was performed.
Anesthesia Type: 1% lidocaine with epinephrine
Consent: Written consent was obtained and risks were reviewed including but not limited to scarring, infection, bleeding, scabbing, incomplete removal, nerve damage and allergy to anesthesia.
Lab: 6
Detail Level: Detailed
Curettage Text: The wound bed was treated with curettage after the biopsy was performed.
Hemostasis: Drysol
Biopsy Method: Dermablade
Notification Instructions: Patient will be notified of biopsy results. However, patient instructed to call the office if not contacted within 2 weeks.
Was A Bandage Applied: Yes
Silver Nitrate Text: The wound bed was treated with silver nitrate after the biopsy was performed.
Dressing: bandage
Post-Care Instructions: I reviewed with the patient in detail post-care instructions. Patient is to keep the biopsy site dry overnight, and then apply bacitracin twice daily until healed. Patient may apply hydrogen peroxide soaks to remove any crusting.
Electrodesiccation And Curettage Text: The wound bed was treated with electrodesiccation and curettage after the biopsy was performed.
Anesthesia Volume In Cc (Will Not Render If 0): 3
Type Of Destruction Used: Curettage
Biopsy Type: H and E
Depth Of Biopsy: dermis

## 2019-05-09 PROBLEM — S51.802A UNSPECIFIED OPEN WOUND OF LEFT FOREARM, INITIAL ENCOUNTER: Status: ACTIVE | Noted: 2019-01-01

## 2019-05-09 PROBLEM — D48.5 NEOPLASM OF UNCERTAIN BEHAVIOR OF SKIN: Status: ACTIVE | Noted: 2019-01-01

## 2019-05-09 NOTE — PROCEDURE: BIOPSY BY SHAVE METHOD
Body Location Override (Optional - Billing Will Still Be Based On Selected Body Map Location If Applicable): left lower lateral leg

## 2019-05-14 PROBLEM — Z48.02 ENCOUNTER FOR REMOVAL OF SUTURES: Status: ACTIVE | Noted: 2019-01-01

## 2019-05-14 NOTE — PROCEDURE: SUTURE REMOVAL (GLOBAL PERIOD)
Detail Level: Detailed
Body Location Override (Optional - Billing Will Still Be Based On Selected Body Map Location If Applicable): right lateral dorsal forearm
Add 88900 Cpt? (Important Note: In 2017 The Use Of 90312 Is Being Tracked By Cms To Determine Future Global Period Reimbursement For Global Periods): no

## 2019-05-29 PROBLEM — C44.629 SQUAMOUS CELL CARCINOMA OF SKIN OF LEFT UPPER LIMB, INCLUDING SHOULDER: Status: ACTIVE | Noted: 2019-01-01

## 2019-05-29 NOTE — PROCEDURE: EXCISION
Path Notes (To The Dermatopathologist): Please check margins. Tagged superiorly at 12 o’clock.\\nAccession # [id number] Path Notes (To The Dermatopathologist): Please check margins. Tagged superiorly at 12 o’clock.\\nAccession # [id number]

## 2019-06-04 PROBLEM — Z48.817 ENCOUNTER FOR SURGICAL AFTERCARE FOLLOWING SURGERY ON THE SKIN AND SUBCUTANEOUS TISSUE: Status: ACTIVE | Noted: 2019-01-01

## 2019-06-04 PROBLEM — C44.722 SQUAMOUS CELL CARCINOMA OF SKIN OF RIGHT LOWER LIMB, INCLUDING HIP: Status: ACTIVE | Noted: 2019-01-01

## 2019-06-04 NOTE — PROCEDURE: CONSULTATION FOR MOHS SURGERY
X Size Of Lesion In Cm (Optional): 1.3
Body Location Override (Optional - Billing Will Still Be Based On Selected Body Map Location If Applicable): right medial knee
Incorporate Mauc In Note: Yes
Location Indication Override (Is Already Calculated Based On Selected Body Location): Area L
Detail Level: Detailed

## 2019-06-04 NOTE — PROCEDURE: TREATMENT REGIMEN
Detail Level: Detailed
Continue Regimen: Cleansing surgical site twice a day with warm water and anti-bacterial soap, apply mupirocin ointment and cover with non-stick telfa and paper tape
Initiate Treatment: doxycycline monohydrate 100 mg capsule PO Frequency: BID Sig: Take 1 tab PO BID x 10 days
Plan: Patient to contact the office if any problems occur for further evaluation and management

## 2019-06-12 PROBLEM — Z48.02 ENCOUNTER FOR REMOVAL OF SUTURES: Status: ACTIVE | Noted: 2019-01-01

## 2019-06-12 PROBLEM — L57.0 ACTINIC KERATOSIS: Status: ACTIVE | Noted: 2019-01-01

## 2019-06-12 NOTE — PROCEDURE: LIQUID NITROGEN
Duration Of Freeze Thaw-Cycle (Seconds): 15
Post-Care Instructions: I reviewed with the patient in detail post-care instructions. Patient is to wear sunprotection, and avoid picking at any of the treated lesions. Pt may apply Vaseline to crusted or scabbing areas.
Detail Level: Detailed
Consent: The patient's consent was obtained including but not limited to risks of crusting, scabbing, blistering, scarring, darker or lighter pigmentary change, recurrence, incomplete removal and infection.
Render Note In Bullet Format When Appropriate: No
Number Of Freeze-Thaw Cycles: 3 freeze-thaw cycles

## 2019-06-12 NOTE — PROCEDURE: SUTURE REMOVAL (GLOBAL PERIOD)
Detail Level: Detailed
Add 12769 Cpt? (Important Note: In 2017 The Use Of 37675 Is Being Tracked By Cms To Determine Future Global Period Reimbursement For Global Periods): yes
Body Location Override (Optional - Billing Will Still Be Based On Selected Body Map Location If Applicable): left distal ulnar dorsal forearm

## 2019-07-02 PROBLEM — C44.722 SQUAMOUS CELL CARCINOMA OF SKIN OF RIGHT LOWER LIMB, INCLUDING HIP: Status: ACTIVE | Noted: 2019-01-01

## 2019-07-02 NOTE — PROCEDURE: MOHS SURGERY
Body Location Override (Optional - Billing Will Still Be Based On Selected Body Map Location If Applicable): right medial knee

## 2019-07-16 PROBLEM — D23.72 OTHER BENIGN NEOPLASM OF SKIN OF LEFT LOWER LIMB, INCLUDING HIP: Status: ACTIVE | Noted: 2019-01-01

## 2019-07-16 PROBLEM — B07.8 OTHER VIRAL WARTS: Status: ACTIVE | Noted: 2019-01-01

## 2019-07-16 PROBLEM — L82.0 INFLAMED SEBORRHEIC KERATOSIS: Status: ACTIVE | Noted: 2019-01-01

## 2019-07-16 PROBLEM — Z48.02 ENCOUNTER FOR REMOVAL OF SUTURES: Status: ACTIVE | Noted: 2019-01-01

## 2019-07-16 PROBLEM — D48.5 NEOPLASM OF UNCERTAIN BEHAVIOR OF SKIN: Status: ACTIVE | Noted: 2019-01-01

## 2019-07-16 NOTE — PROCEDURE: SUTURE REMOVAL (GLOBAL PERIOD)
Detail Level: Detailed
Add 48188 Cpt? (Important Note: In 2017 The Use Of 96488 Is Being Tracked By Cms To Determine Future Global Period Reimbursement For Global Periods): yes
Body Location Override (Optional - Billing Will Still Be Based On Selected Body Map Location If Applicable): right medial knee

## 2019-10-11 PROBLEM — L81.4 OTHER MELANIN HYPERPIGMENTATION: Status: ACTIVE | Noted: 2019-01-01

## 2019-10-11 PROBLEM — L82.1 OTHER SEBORRHEIC KERATOSIS: Status: ACTIVE | Noted: 2019-01-01

## 2019-10-11 PROBLEM — D48.5 NEOPLASM OF UNCERTAIN BEHAVIOR OF SKIN: Status: ACTIVE | Noted: 2019-01-01

## 2019-10-11 NOTE — PROCEDURE: BIOPSY BY SHAVE METHOD
Electrodesiccation Text: The wound bed was treated with electrodesiccation after the biopsy was performed. For Medical Surgical Hx obtained at Admission, Please see Provider H&P

## 2019-10-29 PROBLEM — D04.39 CARCINOMA IN SITU OF SKIN OF OTHER PARTS OF FACE: Status: ACTIVE | Noted: 2019-01-01

## 2019-11-05 PROBLEM — C44.722 SQUAMOUS CELL CARCINOMA OF SKIN OF RIGHT LOWER LIMB, INCLUDING HIP: Status: ACTIVE | Noted: 2019-01-01

## 2019-11-05 PROBLEM — Z48.02 ENCOUNTER FOR REMOVAL OF SUTURES: Status: ACTIVE | Noted: 2019-01-01

## 2019-11-05 NOTE — PROCEDURE: SUTURE REMOVAL (GLOBAL PERIOD)
Detail Level: Detailed
Body Location Override (Optional - Billing Will Still Be Based On Selected Body Map Location If Applicable): left cheek
Add 79288 Cpt? (Important Note: In 2017 The Use Of 30878 Is Being Tracked By Cms To Determine Future Global Period Reimbursement For Global Periods): yes

## 2019-11-13 NOTE — PROCEDURE: EXCISION
Path Notes (To The Dermatopathologist): Please check margins. Tagged at 12 O'Clock [phone] Path Notes (To The Dermatopathologist): Please check margins. Tagged at 12 O'Clock [phone]

## 2019-11-20 PROBLEM — Z48.02 ENCOUNTER FOR REMOVAL OF SUTURES: Status: ACTIVE | Noted: 2019-01-01

## 2019-11-20 PROBLEM — E78.5 HYPERLIPIDEMIA, UNSPECIFIED: Status: ACTIVE | Noted: 2019-01-01

## 2019-11-20 NOTE — PROCEDURE: SUTURE REMOVAL (GLOBAL PERIOD)
Body Location Override (Optional - Billing Will Still Be Based On Selected Body Map Location If Applicable): right knee
Add 19175 Cpt? (Important Note: In 2017 The Use Of 89749 Is Being Tracked By Cms To Determine Future Global Period Reimbursement For Global Periods): yes
Detail Level: Detailed

## 2019-12-12 NOTE — PROCEDURE: BIOPSY BY SHAVE METHOD
----- Message from Myles Sarabia MD sent at 12/12/2019 12:34 PM CST -----  Thyroid tests normal.   Silver Nitrate Text: The wound bed was treated with silver nitrate after the biopsy was performed.

## 2020-02-16 NOTE — PROCEDURE: MOHS SURGERY
179.8 Full Thickness Lip Wedge Repair (Flap) Text: Given the location of the defect and the proximity to free margins a full thickness wedge repair was deemed most appropriate.  Using a sterile surgical marker, the appropriate repair was drawn incorporating the defect and placing the expected incisions perpendicular to the vermilion border.  The vermilion border was also meticulously outlined to ensure appropriate reapproximation during the repair.  The area thus outlined was incised through and through with a #15 scalpel blade.  The muscularis and dermis were reaproximated with deep sutures following hemostasis. Care was taken to realign the vermilion border before proceeding with the superficial closure.  Once the vermilion was realigned the superfical and mucosal closure was finished.

## 2020-09-18 NOTE — PROCEDURE: EXCISION
Patient Discharge Instructions: Following Posterior Lumbar Spine Surgery   Your Surgeon:  Javier Enamorado DO [facility]      Activity:  • Walk as often as tolerable.  Early walking and mobility is encouraged.  You can also go up and down stairs as much as tolerated.  • Light bending and stretching is appropriate to comfort only; DO NOT strain your back.   • No excessive twisting or heavy impact activity until discussed in your post-surgical clinic follow-up appointment.  This includes avoidance of vacuuming, sweeping, lawn care, shoveling, etc.  • No driving until cleared by the provider.  You cannot drive while taking opioid pain medication.  • No lifting greater than 5lbs for the first 2 weeks after surgery.  After the first 2 weeks, no lifting greater than 20lbs for the next 4 weeks or until your follow up with your surgeon at 6 weeks.  • Discuss with your provider when you can return to work.  This will depend on how you are feeling and your job requirements.    Medication:  • You will be able to resume most of your medication after surgery  • If you were on an anticoagulation medication such as aspirin, Plavix, warfarin, you can restart these at 2 weeks after surgery unless directed otherwise by your provider.  • NSAIDs such as Motrin, ibuprofen, Advil, Aleve, naproxen can be restarted at 2 weeks after surgery.  You can also restart any vitamins or herbal medications at that time.   • If you were given a prescription for pain medication or muscle relaxants you should take these as prescribed.  These should be taken only as needed and you should try to wean these as early as possible.     Incision Care:  • For posterior back surgeries you will likely have either staples or dissolvable sutures.  The staples with be removed 2-3 weeks after surgery at your follow up visit.  The dissolvable sutures do not require removal.    • You can remove the dressing from the hospital on Sunday and start changing daily.  Remove the  dressing for showers.  Allow the soapy water to run over the top of the incision.  NO direct water pressure.  Pat dry with a clean towel and replace with a new white gauze dressing.    o You can use a white “island dressing” or gauze and tape  • You will need help at home to change the dressing.  The dressing should be changed daily.  • Do not use any cream, oils, ointment on your incision  • Do not soak in any baths, hot tubs, or pools for the first 6 weeks after surgery    Back Brace:  • The brace should be worn at all times except when lying in bed or to shower/bathe unless directed otherwise.   DO NOT sleep or lay in bed with your back brace on.  • The brace should fit snug to give you good posture and prevent excessive mobility.      Follow Up  • You will have a follow up arranged 2-3 weeks after surgery with the nurse practitioner to ensure you are healing well and remove staples if they are present.   At this visit you can also discuss restarting medications that were stopped prior to surgery.   • You will have a follow up around 6 weeks after surgery with the surgeon.    • Follow up appointments will be arranged prior to you being discharged from the hospital.  If there is no follow up arranged or you have questions, call the office at [phone].     Call the Office If:  • You have increased drainage, foul smelling drainage, swelling, warmth at the incision site  • Fever greater than 100.4  • Shaking chills  • New weakness, tingling, or numbness in your arms or legs  • Increased pain  • Increased difficulty swallowing    Call 911 or present to the ED for the following:  • Chest pain  • Shortness of breath  • Trouble controlling bowels or bladder  • Pain in the calf that is warm to touch and tender     Repair Type: Intermediate

## 2021-05-13 NOTE — PROCEDURE: EXCISION
Appointment made for 1/31/2020 with [facility] Unknown if ever smoked [facility] Intermediate Repair Preamble Text (Leave Blank If You Do Not Want): Undermining was performed with blunt dissection.

## 2021-11-10 NOTE — PROCEDURE: MOHS SURGERY
show Skin Substitute Text: The defect edges were debeveled with a #15 scalpel blade.  Given the location of the defect, shape of the defect and the proximity to free margins a skin substitute graft was deemed most appropriate.  The graft material was trimmed to fit the size of the defect. The graft was then placed in the primary defect and oriented appropriately.

## 2022-01-01 NOTE — PROCEDURE: OTHER
Other (Free Text): Patient declined biopsy today. A biopsy was recommended to rule out SCC. Patient is having swelling issues on his lower legs and is under treatment with his primary care physician and wants to have that under control first prior to biopsy. Patient is agreeable to having lesion treated by LN2 at this time. He will monitor the lesion and contact office if any issues arise.
Note Text (......Xxx Chief Complaint.): This diagnosis correlates with the
Detail Level: Simple
As per mother c/o decreased PO intake and decreased urine output.

## 2022-04-07 NOTE — PROCEDURE: EXCISION
(4) no impairment Bi-Rhombic Flap Text: The defect edges were debeveled with a #15 scalpel blade.  Given the location of the defect and the proximity to free margins a bi-rhombic flap was deemed most appropriate.  Using a sterile surgical marker, an appropriate rhombic flap was drawn incorporating the defect. The area thus outlined was incised deep to adipose tissue with a #15 scalpel blade.  The skin margins were undermined to an appropriate distance in all directions utilizing iris scissors.

## 2022-08-17 NOTE — PROCEDURE: MOHS SURGERY
Diagnosis:   Encounter Diagnoses   Name Primary?   • Small cell carcinoma (CMS/HCC) Yes   • Other malignant lymphomas, unspecified site, extranodal and solid organ sites        Regimen:CARBOplatin AUC = 5 D1 + Etoposide 100 mg/m2 D1-3 every 21 days x 6 cycles  Cycle/Day: Cycle 4, Day 2  Is this a C1D1 appt?  No    Dr. Ruiz is supervising clinician today.    ECOG:   ECOG [08/17/22 1014]   ECOG Performance Status 1       Review and verified Advanced Directives: No, patient has no interest in completing Advanced Directives at this time    Verified if patient has state DNR bracelet on: No; Full Code    Nursing Assessment:   A focused nursing assessment addressing the toxicity of chemotherapy was performed and the patient reports the following:    Anxiety/Depression/Insomnia: Anxiety: No, Depression: No and Insomnia: No  Pain: NO    Toxicity Assessment    Auditory/Ear  Assessment: Yes (Within Defined Limits)    Blood/Bone Marrow  Assessment: Not Reviewed    Cardiac General  Assessment: Yes (w/ Exceptions to WDL)  Hypertension: Grade 1    Constitutional  Assessment: Yes (w/ Exceptions to WDL)  Fatigue: Grade 1    Dermatology/Skin  Assessment: Yes (w/ Exceptions to WDL)  Bruising: Grade 2    Endocrine  Assessment: Yes (Within Defined Limits)    Gastrointestinal  Assessment: Yes (Within Defined Limits)    Hemorrhage/Bleeding  Assessment: Yes (Within Defined Limits)    Infection  Assessment: Yes (Within Defined Limits)    Lymphatics  Assessment: Yes (Within Defined Limits)    Metabolic/Laboratory  Assessment: Not Reviewed    Musculoskeletal  Assessment: Yes (Within Defined Limits)    Neurology  Assessment: Yes (w/ Exceptions to WDL)  Numbness: Grade 1  Tingling: Grade 1    Ocular  Assessment: Yes (Within Defined Limits)    Pain  Assessment: Yes (Within Defined Limits)  Pain: Grade 1    Pulmonary/Upper Respiratory  Assessment: Yes (w/ Exceptions to WDL)  Cough: Grade 1  Dyspnea: Grade 1    Genitourinary  Assessment: Yes  (Within Defined Limits)    Sexual/Reproductive  Assessment: Not Reviewed        Patient confirms receipt of a signed copy of Anti-Cancer Treatment consent and verbalizes understanding of treatment plan: Yes.     Pre-Treatment: Treatment consent signed  Patient has valid pre-authorization  VS completed  Height and weight verified  BSA independently double checked & verified by two practitioners  Premed orders, including hydration, are verified prior to administration  Treatment parameters verified in patient protocol  Chemotherapy doses independently doubled checked & verified by two practitioners    Treatment: I have reviewed the following with the patient:  Name of chemo drug, duration and route of infusion, and reportable infusion-related symptoms.  Chemotherapy has not ; double checked & verified by two practitioners  Appearance and physical integrity of drugs meets standard of drug monograph; double checked & verified by two practitioners  Rate set on infusion pump is in alignment with ordered rate; double checked & verified by two practitioners  Blood return confirmed before, during and after treatment administered  Infusion pump used for non-vesicant drugs  Drugs were administered in proper sequencing  Refer to LDA and MAR for line assessment and medication administration    Post Treatment: Treatment tolerated well; no adverse reaction    Does the Patient have a central line?  yes:   Device: Port  Central Line Site: Right  and Chest  Central Line Site Appearance: clear and WDL  Is this a port? Yes: Implanted port accessed using a 20 gauge non-coring needle primed with 0.9% sodium chloride. Good blood return obtained.  Blood not collected.  Site Care: Aseptic site care per protocol, Sterile gauze and tape dressing applied and Injection caps changed/applied  Central line flushed with: 10 ml 0.9 normal saline and 50 units of Heparin  Willis Needle: Willis needle left in place    Transfusion: Not  needed    Integrative Medicine: No    Oral Chemotherapy: No    Education: No new instructions needed    Next appointment scheduled:   Future Appointments   Date Time Provider Department Center   8/18/2022 10:15 AM [facility] TREATMENT CHAIR [address]   8/26/2022 10:00 AM Tarah Jay MD [facility]   9/1/2022  8:30 AM [facility] ACL LAB [facility]   9/1/2022  9:00 AM Eliazar Esteban MD [address]       Patient instructed to call the office with any questions or concerns.    Patient Discharged: patient discharged to home per self, ambulatory, to home   Surgeon Performing Repair (Optional): Dr. Mahin Bradshaw

## 2023-07-17 NOTE — PROCEDURE: MOHS SURGERY
Patient with Paroxysmal (<7 days) atrial fibrillation which is controlled currently with Beta Blocker. Patient is currently in sinus rhythm.[id number] Score: 5. HASBLED Score: 5. Anticoagulation not indicated due to risk of bleeding, hx of GIB.   Stage 2: Number Of Blocks?: 1

## 2024-03-21 NOTE — PROCEDURE: EXCISION
Spiral Flap Text: The defect edges were debeveled with a #15 scalpel blade.  Given the location of the defect, shape of the defect and the proximity to free margins a spiral flap was deemed most appropriate.  Using a sterile surgical marker, an appropriate rotation flap was drawn incorporating the defect and placing the expected incisions within the relaxed skin tension lines where possible. The area thus outlined was incised deep to adipose tissue with a #15 scalpel blade.  The skin margins were undermined to an appropriate distance in all directions utilizing iris scissors. seated/minimum assist (75% patients effort) minimum assist (75% patients effort) moderate assist (50% patients effort)

## 2024-04-22 NOTE — PROCEDURE: EXCISION
Writer placed call to Carlos at [phone] to schedule service to infectious disease.     Patient scheduled: Yes, Patient accepted appt on: 04/26/2024  with Dov Hall M.D. . Reviewed office location with patient, and Welcome letter sent :Message via the [employer] simin reminder  04/22/24/ms      ----- Message from Zulma Florez LPN sent at 4/22/2024 12:23 PM CDT -----  Regarding: FW: Follow up    ----- Message -----  From: Dov Hall MD  Sent: 4/22/2024  10:01 AM CDT  To: MELISA Hall Id Nurse Msg Pool  Subject: Follow up                                        Good morning,    Could you contact Mr. Anirudh Estrella to see if he can come in to clinic on Friday? His cardiologist would like an updated evaluation to ensure his pacemaker can be replaced safely.    Thank you.       Ear Star Wedge Flap Text: The defect edges were debeveled with a #15 blade scalpel.  Given the location of the defect and the proximity to free margins (helical rim) an ear star wedge flap was deemed most appropriate.  Using a sterile surgical marker, the appropriate flap was drawn incorporating the defect and placing the expected incisions between the helical rim and antihelix where possible.  The area thus outlined was incised through and through with a #15 scalpel blade.

## 2025-02-11 NOTE — PROCEDURE: EXCISION
Negative Double O-Z Plasty Text: The defect edges were debeveled with a #15 scalpel blade.  Given the location of the defect, shape of the defect and the proximity to free margins a Double O-Z plasty (double transposition flap) was deemed most appropriate.  Using a sterile surgical marker, the appropriate transposition flaps were drawn incorporating the defect and placing the expected incisions within the relaxed skin tension lines where possible. The area thus outlined was incised deep to adipose tissue with a #15 scalpel blade.  The skin margins were undermined to an appropriate distance in all directions utilizing iris scissors.  Hemostasis was achieved with electrocautery.  The flaps were then transposed into place, one clockwise and the other counterclockwise, and anchored with interrupted buried subcutaneous sutures.

## 2025-03-10 NOTE — PROCEDURE: MOHS SURGERY
Pt is here in office today withFortino    Location Indication Override (Is Already Calculated Based On Selected Body Location): Area M
